# Patient Record
Sex: FEMALE | Race: WHITE | NOT HISPANIC OR LATINO | Employment: UNEMPLOYED | URBAN - METROPOLITAN AREA
[De-identification: names, ages, dates, MRNs, and addresses within clinical notes are randomized per-mention and may not be internally consistent; named-entity substitution may affect disease eponyms.]

---

## 2020-01-29 ENCOUNTER — TRANSCRIBE ORDERS (OUTPATIENT)
Dept: ADMINISTRATIVE | Facility: HOSPITAL | Age: 35
End: 2020-01-29

## 2020-01-29 DIAGNOSIS — R20.2 PARESTHESIA: Primary | ICD-10-CM

## 2020-02-26 ENCOUNTER — APPOINTMENT (OUTPATIENT)
Dept: INFUSION THERAPY | Facility: HOSPITAL | Age: 35
End: 2020-02-26

## 2020-03-12 ENCOUNTER — HOSPITAL ENCOUNTER (OUTPATIENT)
Dept: INFUSION THERAPY | Facility: HOSPITAL | Age: 35
Discharge: HOME | End: 2020-03-12
Admitting: PSYCHIATRY & NEUROLOGY

## 2020-03-12 DIAGNOSIS — R20.2 PARESTHESIA: ICD-10-CM

## 2020-03-12 PROCEDURE — 95886 MUSC TEST DONE W/N TEST COMP: CPT

## 2020-03-12 PROCEDURE — 95911 NRV CNDJ TEST 9-10 STUDIES: CPT | Performed by: PSYCHIATRY & NEUROLOGY

## 2020-03-12 PROCEDURE — 95911 NRV CNDJ TEST 9-10 STUDIES: CPT

## 2020-03-12 PROCEDURE — 95886 MUSC TEST DONE W/N TEST COMP: CPT | Performed by: PSYCHIATRY & NEUROLOGY

## 2020-03-12 NOTE — PROCEDURES
EMG and Nerve Conduction Studies    I.      Instrument used: Neuromax 1002  II.     Please see data sheets for tabular summary of NCS and details on methods, temperatures and lab standards.   III.    EMG muscles tested for upper extremity studies include the deltoid, biceps, triceps, pronator teres, extensor digitorum communis, first dorsal interosseous and abductor pollicis brevis.    IV.   EMG muscles tested for lower extremity studies include the vastus lateralis, tibialis anterior, peroneus longus, medial gastrocnemius and extensor digitorum brevis.    V.    Additional muscles tested as needed.  Paraspinal muscles tested as needed.   VI.   Please see data sheets for tabular summary of EMG findings.   VII. The complete report includes the data sheets.      Indication: Neck and low back pain with bilateral sciatica  History: 34-year-old white female with neck and low back pain with bilateral sciatica.  She also describes waking from sleep with her right hand numb and tingly taking a few minutes for it to improve.  She has numbness and tingling radiating down the legs additionally.      Ht: Not reported  Wt: Not reported  HbA1C: No results found for: HGBA1C  TSH: No results found for: TSH    Technical summary:  Nerve conduction studies were obtained in the right arm and right leg.  Skin temperatures on the right palm were at least 34 °C.  Temperature is at the right ankle was around 31 °C.  Temperature correction was not needed since the distal latencies were normal.  Needle examination was obtained on selected muscles of all 4 extremities.    Results:  1.  Normal right median antidromic sensory study.  2.  Normal right median orthodromic palmar sensory latency at 2.2 ms with normal amplitude.  3.  Normal right ulnar sensory study.  4.  Normal right median motor study.  5.  Normal right ulnar motor study.  6.  Normal right sural sensory study.  7.  Normal right superficial peroneal sensory study.  8.  Normal right  peroneal motor study.  9.  Normal right tibial motor study.  10.  Needle examination was obtained on selected muscles in all 4 extremities showing normal insertional activities throughout.  There were normal motor units and recruitment patterns throughout.    Impression:  Normal study.  No evidence of a right median neuropathy at the wrist or a more diffuse peripheral neuropathy.  No evidence of a cervical or lumbosacral radiculopathy on either side by this study.  Study results were discussed with the patient.    Wenceslao Dhillon M.D.              Dictated utilizing Dragon dictation.

## 2021-06-30 ENCOUNTER — OFFICE (AMBULATORY)
Dept: URBAN - METROPOLITAN AREA CLINIC 64 | Facility: CLINIC | Age: 36
End: 2021-06-30
Payer: COMMERCIAL

## 2021-06-30 VITALS
HEIGHT: 60 IN | DIASTOLIC BLOOD PRESSURE: 75 MMHG | WEIGHT: 189 LBS | HEART RATE: 94 BPM | SYSTOLIC BLOOD PRESSURE: 118 MMHG

## 2021-06-30 DIAGNOSIS — K92.0 HEMATEMESIS: ICD-10-CM

## 2021-06-30 DIAGNOSIS — R10.13 EPIGASTRIC PAIN: ICD-10-CM

## 2021-06-30 DIAGNOSIS — R10.11 RIGHT UPPER QUADRANT PAIN: ICD-10-CM

## 2021-06-30 DIAGNOSIS — K76.0 FATTY (CHANGE OF) LIVER, NOT ELSEWHERE CLASSIFIED: ICD-10-CM

## 2021-06-30 DIAGNOSIS — K62.5 HEMORRHAGE OF ANUS AND RECTUM: ICD-10-CM

## 2021-06-30 DIAGNOSIS — K59.00 CONSTIPATION, UNSPECIFIED: ICD-10-CM

## 2021-06-30 DIAGNOSIS — R11.2 NAUSEA WITH VOMITING, UNSPECIFIED: ICD-10-CM

## 2021-06-30 PROCEDURE — 99204 OFFICE O/P NEW MOD 45 MIN: CPT | Performed by: INTERNAL MEDICINE

## 2021-07-09 PROBLEM — R10.13 EPIGASTRIC PAIN: Status: ACTIVE | Noted: 2021-07-09

## 2021-07-20 PROCEDURE — 99283 EMERGENCY DEPT VISIT LOW MDM: CPT

## 2021-07-21 ENCOUNTER — APPOINTMENT (OUTPATIENT)
Dept: CT IMAGING | Facility: HOSPITAL | Age: 36
End: 2021-07-21

## 2021-07-21 ENCOUNTER — HOSPITAL ENCOUNTER (EMERGENCY)
Facility: HOSPITAL | Age: 36
Discharge: HOME | End: 2021-07-21
Attending: EMERGENCY MEDICINE | Admitting: EMERGENCY MEDICINE

## 2021-07-21 ENCOUNTER — APPOINTMENT (OUTPATIENT)
Dept: GENERAL RADIOLOGY | Facility: HOSPITAL | Age: 36
End: 2021-07-21

## 2021-07-21 VITALS
TEMPERATURE: 97.9 F | OXYGEN SATURATION: 98 % | HEART RATE: 72 BPM | BODY MASS INDEX: 35.93 KG/M2 | HEIGHT: 60 IN | RESPIRATION RATE: 18 BRPM | SYSTOLIC BLOOD PRESSURE: 112 MMHG | DIASTOLIC BLOOD PRESSURE: 76 MMHG | WEIGHT: 183 LBS

## 2021-07-21 DIAGNOSIS — S33.5XXA LUMBAR SPRAIN, INITIAL ENCOUNTER: ICD-10-CM

## 2021-07-21 DIAGNOSIS — S13.9XXA NECK SPRAIN, INITIAL ENCOUNTER: ICD-10-CM

## 2021-07-21 DIAGNOSIS — S83.91XA SPRAIN OF RIGHT KNEE, UNSPECIFIED LIGAMENT, INITIAL ENCOUNTER: ICD-10-CM

## 2021-07-21 DIAGNOSIS — S09.90XA INJURY OF HEAD, INITIAL ENCOUNTER: Primary | ICD-10-CM

## 2021-07-21 PROCEDURE — 72131 CT LUMBAR SPINE W/O DYE: CPT

## 2021-07-21 PROCEDURE — 73564 X-RAY EXAM KNEE 4 OR MORE: CPT

## 2021-07-21 PROCEDURE — 70450 CT HEAD/BRAIN W/O DYE: CPT

## 2021-07-21 PROCEDURE — 72125 CT NECK SPINE W/O DYE: CPT

## 2021-07-21 RX ORDER — CYCLOBENZAPRINE HCL 10 MG
10 TABLET ORAL 3 TIMES DAILY PRN
Qty: 15 TABLET | Refills: 0 | Status: SHIPPED | OUTPATIENT
Start: 2021-07-21 | End: 2022-01-25

## 2021-07-21 RX ORDER — IBUPROFEN 400 MG/1
800 TABLET ORAL ONCE
Status: COMPLETED | OUTPATIENT
Start: 2021-07-21 | End: 2021-07-21

## 2021-07-21 RX ADMIN — IBUPROFEN 800 MG: 400 TABLET, FILM COATED ORAL at 03:54
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
## 2021-07-23 ENCOUNTER — OFFICE (AMBULATORY)
Dept: URBAN - METROPOLITAN AREA PATHOLOGY 4 | Facility: PATHOLOGY | Age: 36
End: 2021-07-23
Payer: COMMERCIAL

## 2021-07-23 ENCOUNTER — ON CAMPUS - OUTPATIENT (AMBULATORY)
Dept: URBAN - METROPOLITAN AREA HOSPITAL 2 | Facility: HOSPITAL | Age: 36
End: 2021-07-23
Payer: COMMERCIAL

## 2021-07-23 VITALS
SYSTOLIC BLOOD PRESSURE: 132 MMHG | OXYGEN SATURATION: 94 % | WEIGHT: 183 LBS | DIASTOLIC BLOOD PRESSURE: 79 MMHG | OXYGEN SATURATION: 99 % | SYSTOLIC BLOOD PRESSURE: 100 MMHG | HEIGHT: 60 IN | DIASTOLIC BLOOD PRESSURE: 84 MMHG | SYSTOLIC BLOOD PRESSURE: 97 MMHG | DIASTOLIC BLOOD PRESSURE: 92 MMHG | HEART RATE: 83 BPM | RESPIRATION RATE: 19 BRPM | DIASTOLIC BLOOD PRESSURE: 60 MMHG | RESPIRATION RATE: 17 BRPM | SYSTOLIC BLOOD PRESSURE: 116 MMHG | RESPIRATION RATE: 18 BRPM | DIASTOLIC BLOOD PRESSURE: 96 MMHG | DIASTOLIC BLOOD PRESSURE: 71 MMHG | RESPIRATION RATE: 16 BRPM | SYSTOLIC BLOOD PRESSURE: 127 MMHG | HEART RATE: 74 BPM | HEART RATE: 70 BPM | SYSTOLIC BLOOD PRESSURE: 128 MMHG | HEART RATE: 77 BPM | HEART RATE: 67 BPM | TEMPERATURE: 96.9 F | HEART RATE: 86 BPM | OXYGEN SATURATION: 98 %

## 2021-07-23 DIAGNOSIS — K29.80 DUODENITIS WITHOUT BLEEDING: ICD-10-CM

## 2021-07-23 DIAGNOSIS — K25.3 ACUTE GASTRIC ULCER WITHOUT HEMORRHAGE OR PERFORATION: ICD-10-CM

## 2021-07-23 DIAGNOSIS — R10.11 RIGHT UPPER QUADRANT PAIN: ICD-10-CM

## 2021-07-23 DIAGNOSIS — K31.89 OTHER DISEASES OF STOMACH AND DUODENUM: ICD-10-CM

## 2021-07-23 DIAGNOSIS — R10.13 EPIGASTRIC PAIN: ICD-10-CM

## 2021-07-23 DIAGNOSIS — K92.0 HEMATEMESIS: ICD-10-CM

## 2021-07-23 DIAGNOSIS — K21.00 GASTRO-ESOPHAGEAL REFLUX DISEASE WITH ESOPHAGITIS, WITHOUT B: ICD-10-CM

## 2021-07-23 PROBLEM — K20.80 OTHER ESOPHAGITIS WITHOUT BLEEDING: Status: ACTIVE | Noted: 2021-07-23

## 2021-07-23 LAB
GI HISTOLOGY: A. UNSPECIFIED: (no result)
GI HISTOLOGY: B. SELECT: (no result)
GI HISTOLOGY: PDF REPORT: (no result)

## 2021-07-23 PROCEDURE — 88305 TISSUE EXAM BY PATHOLOGIST: CPT | Performed by: INTERNAL MEDICINE

## 2021-07-23 PROCEDURE — 43239 EGD BIOPSY SINGLE/MULTIPLE: CPT | Performed by: INTERNAL MEDICINE

## 2021-07-23 RX ORDER — PANTOPRAZOLE 40 MG/1
40 TABLET, DELAYED RELEASE ORAL
Qty: 90 | Refills: 3 | Status: ACTIVE
Start: 2021-07-23

## 2021-07-23 RX ORDER — FAMOTIDINE 40 MG/1
40 TABLET, FILM COATED ORAL
Qty: 90 | Refills: 3 | Status: ACTIVE
Start: 2021-07-23

## 2021-07-27 ENCOUNTER — OFFICE VISIT (OUTPATIENT)
Dept: ORTHOPEDIC SURGERY | Facility: CLINIC | Age: 36
End: 2021-07-27

## 2021-07-27 VITALS — WEIGHT: 183 LBS | BODY MASS INDEX: 35.93 KG/M2 | HEIGHT: 60 IN

## 2021-07-27 DIAGNOSIS — M25.562 LEFT KNEE PAIN, UNSPECIFIED CHRONICITY: Primary | ICD-10-CM

## 2021-07-27 DIAGNOSIS — S89.91XA INJURY OF RIGHT KNEE, INITIAL ENCOUNTER: ICD-10-CM

## 2021-07-27 PROBLEM — A49.9 ESBL (EXTENDED SPECTRUM BETA-LACTAMASE) PRODUCING BACTERIA INFECTION: Status: ACTIVE | Noted: 2019-09-16

## 2021-07-27 PROBLEM — Z16.12 ESBL (EXTENDED SPECTRUM BETA-LACTAMASE) PRODUCING BACTERIA INFECTION: Status: ACTIVE | Noted: 2019-09-16

## 2021-07-27 PROCEDURE — 99203 OFFICE O/P NEW LOW 30 MIN: CPT | Performed by: ORTHOPAEDIC SURGERY

## 2021-07-27 PROCEDURE — 99406 BEHAV CHNG SMOKING 3-10 MIN: CPT | Performed by: ORTHOPAEDIC SURGERY
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
## 2021-08-16 ENCOUNTER — APPOINTMENT (OUTPATIENT)
Dept: MRI IMAGING | Facility: HOSPITAL | Age: 36
End: 2021-08-16

## 2021-08-25 ENCOUNTER — OFFICE VISIT (OUTPATIENT)
Dept: FAMILY MEDICINE CLINIC | Facility: CLINIC | Age: 36
End: 2021-08-25

## 2021-08-25 VITALS
HEART RATE: 102 BPM | DIASTOLIC BLOOD PRESSURE: 82 MMHG | BODY MASS INDEX: 34.73 KG/M2 | SYSTOLIC BLOOD PRESSURE: 134 MMHG | WEIGHT: 177.85 LBS | TEMPERATURE: 97.1 F | OXYGEN SATURATION: 99 %

## 2021-08-25 DIAGNOSIS — K59.09 CHRONIC CONSTIPATION: ICD-10-CM

## 2021-08-25 DIAGNOSIS — R32 URINARY INCONTINENCE, UNSPECIFIED TYPE: ICD-10-CM

## 2021-08-25 DIAGNOSIS — D71 CHRONIC GRANULOMATOUS DISEASE (HCC): Primary | ICD-10-CM

## 2021-08-25 DIAGNOSIS — R06.09 DYSPNEA ON EXERTION: ICD-10-CM

## 2021-08-25 PROCEDURE — 99202 OFFICE O/P NEW SF 15 MIN: CPT | Performed by: FAMILY MEDICINE

## 2021-08-25 RX ORDER — MULTIPLE VITAMINS W/ MINERALS TAB 9MG-400MCG
1 TAB ORAL DAILY
COMMUNITY
End: 2023-03-02

## 2021-08-25 RX ORDER — PANTOPRAZOLE SODIUM 40 MG/1
40 TABLET, DELAYED RELEASE ORAL DAILY
COMMUNITY

## 2021-08-25 RX ORDER — DESVENLAFAXINE 100 MG/1
100 TABLET, EXTENDED RELEASE ORAL DAILY
COMMUNITY
Start: 2021-05-13 | End: 2022-01-25

## 2021-08-25 RX ORDER — FAMOTIDINE 40 MG/1
1 TABLET, FILM COATED ORAL
COMMUNITY
Start: 2021-07-23

## 2021-08-25 RX ORDER — ZINC 25 MG
1 TABLET ORAL DAILY
COMMUNITY

## 2021-08-25 RX ORDER — SPIRONOLACTONE 50 MG/1
100 TABLET, FILM COATED ORAL 2 TIMES DAILY
COMMUNITY
Start: 2021-05-13

## 2021-08-25 RX ORDER — SORBITOL SOLUTION 70 %
SOLUTION, ORAL MISCELLANEOUS
Qty: 60 ML | Refills: 0 | Status: SHIPPED | OUTPATIENT
Start: 2021-08-25 | End: 2022-01-25

## 2021-08-25 RX ORDER — METHENAMINE, SODIUM PHOSPHATE, MONOBASIC, MONOHYDRATE, PHENYL SALICYLATE, METHYLENE BLUE, AND HYOSCYAMINE SULFATE 120; 40.8; 36; 10; .12 MG/1; MG/1; MG/1; MG/1; MG/1
1 CAPSULE ORAL 4 TIMES DAILY
COMMUNITY
Start: 2021-06-29 | End: 2022-01-25

## 2021-08-25 RX ORDER — MULTIVIT-MIN/IRON/FOLIC ACID/K 18-600-40
1 CAPSULE ORAL DAILY
COMMUNITY
End: 2023-03-02

## 2021-08-25 RX ORDER — VALACYCLOVIR HYDROCHLORIDE 500 MG/1
500 TABLET, FILM COATED ORAL DAILY
COMMUNITY
Start: 2021-05-13

## 2021-08-25 RX ORDER — ERGOCALCIFEROL 1.25 MG/1
50000 CAPSULE ORAL WEEKLY
COMMUNITY
End: 2023-03-02

## 2021-08-25 RX ORDER — PHENTERMINE HYDROCHLORIDE 37.5 MG/1
37.5 TABLET ORAL
COMMUNITY
Start: 2021-06-29 | End: 2021-11-08 | Stop reason: SDUPTHER

## 2021-08-25 RX ORDER — BUSPIRONE HYDROCHLORIDE 15 MG/1
10 TABLET ORAL 2 TIMES DAILY
COMMUNITY
Start: 2021-08-05 | End: 2022-06-21

## 2021-08-25 NOTE — PROGRESS NOTES
Subjective   Rand Schilling is a 36 y.o. female.     Rand Schilling is to establish as a new patient.  She has some issues with chronic constipation that she is concerned about. She has been diagnosed with chronic granulomatous disease by her GI, though I do not have all of the notes yet.  She also has used phentermine in the past for weight loss from another provider and is interested in getting a prescription for that as well. There is no history of chest pain or dyspnea. There is no history of issue with bladder dysfunction. There is no history of dizziness or lightheadedness. There is no history of issue with sleep or mood. There is no history of issue with present medication.            /82 (BP Location: Left arm, Patient Position: Sitting, Cuff Size: Large Adult)   Pulse 102   Temp 97.1 °F (36.2 °C) (Temporal)   Wt 80.7 kg (177 lb 13.6 oz)   SpO2 99%   BMI 34.73 kg/m²       Chief Complaint   Patient presents with   • GI Problem     New Pat Est - nothing is helping constapated    • Weight Loss     ob-gyn in [facility] prescribes it but wants to see if you can           Current Outpatient Medications:   •  Ascorbic Acid (Vitamin C) 500 MG capsule, Take 1 capsule by mouth Daily., Disp: , Rfl:   •  busPIRone (BUSPAR) 15 MG tablet, Take 7.5 mg by mouth 2 (Two) Times a Day., Disp: , Rfl:   •  cyanocobalamin ([employer] Vitamin B-12) 2000 MCG tablet, Take 2,000 mcg by mouth Daily., Disp: , Rfl:   •  desvenlafaxine (PRISTIQ) 100 MG 24 hr tablet, Take 100 mg by mouth Daily., Disp: , Rfl:   •  famotidine (PEPCID) 40 MG tablet, Take 1 tablet by mouth every night at bedtime., Disp: , Rfl:   •  multivitamin with minerals (One-A-Day Womens) tablet tablet, Take 1 tablet by mouth Daily., Disp: , Rfl:   •  pantoprazole (PROTONIX) 40 MG EC tablet, Take 40 mg by mouth Daily., Disp: , Rfl:   •  phentermine (ADIPEX-P) 37.5 MG tablet, Take 37.5 mg by mouth Every Morning Before Breakfast., Disp: , Rfl:   •  spironolactone (ALDACTONE) 50  MG tablet, Take 50 mg by mouth 2 (two) times a day., Disp: , Rfl:   •  uribel (URO-MP) 118 MG capsule capsule, Take 1 capsule by mouth 4 (Four) Times a Day., Disp: , Rfl:   •  valACYclovir (VALTREX) 500 MG tablet, Take 500 mg by mouth Daily., Disp: , Rfl:   •  vitamin D (ERGOCALCIFEROL) 1.25 MG (18273 UT) capsule capsule, Take 50,000 Units by mouth 1 (One) Time Per Week., Disp: , Rfl:   •  Zinc 25 MG tablet, Take 1 tablet by mouth Daily., Disp: , Rfl:   •  cyclobenzaprine (FLEXERIL) 10 MG tablet, Take 1 tablet by mouth 3 (Three) Times a Day As Needed for Muscle Spasms for up to 15 doses., Disp: 15 tablet, Rfl: 0  •  sorbitol 70 % solution, Drink the contents one time, mix the sorbitol with unsweetened tea, Disp: 60 mL, Rfl: 0        The following portions of the patient's history were reviewed and updated as appropriate: allergies, current medications, past family history, past medical history, past social history, past surgical history, and problem list.    Review of Systems   Constitutional: Positive for fatigue. Negative for activity change and fever.   HENT: Negative for congestion, sinus pressure, sinus pain, sore throat and trouble swallowing.    Eyes: Negative for visual disturbance.   Respiratory: Negative for chest tightness, shortness of breath and wheezing.    Cardiovascular: Negative for chest pain.   Gastrointestinal: Positive for constipation. Negative for abdominal distention, abdominal pain, diarrhea, nausea and vomiting.   Genitourinary: Positive for frequency and urgency. Negative for difficulty urinating and dysuria.   Musculoskeletal: Negative for back pain and neck pain.   Skin: Negative for rash.   Psychiatric/Behavioral: Negative for agitation, decreased concentration, hallucinations and suicidal ideas.       Objective   Physical Exam  Vitals and nursing note reviewed.   Cardiovascular:      Rate and Rhythm: Normal rate and regular rhythm.      Heart sounds: Normal heart sounds. No murmur  heard.     Pulmonary:      Effort: Pulmonary effort is normal.      Breath sounds: No wheezing or rales.   Abdominal:      General: Bowel sounds are normal.      Palpations: Abdomen is soft.      Tenderness: There is no abdominal tenderness. There is no guarding.   Musculoskeletal:      Cervical back: Neck supple.      Right lower leg: No edema.      Left lower leg: No edema.   Lymphadenopathy:      Cervical: No cervical adenopathy.   Skin:     Findings: No rash.   Neurological:      General: No focal deficit present.      Mental Status: She is alert and oriented to person, place, and time.   Psychiatric:         Mood and Affect: Mood normal.           Assessment/Plan   Problems Addressed this Visit     None      Visit Diagnoses     Chronic granulomatous disease (CMS/HCC)    -  Primary    Relevant Medications    cyanocobalamin ([employer] Vitamin B-12) 2000 MCG tablet    Other Relevant Orders    Ambulatory Referral to Pulmonology (Completed)    Chronic constipation        Urinary incontinence, unspecified type        Relevant Orders    Ambulatory Referral to Urology (Completed)    Dyspnea on exertion        Relevant Orders    Ambulatory Referral to Pulmonology (Completed)      Diagnoses       Codes Comments    Chronic granulomatous disease (CMS/HCC)    -  Primary ICD-10-CM: D71  ICD-9-CM: 288.1     Chronic constipation     ICD-10-CM: K59.09  ICD-9-CM: 564.00     Urinary incontinence, unspecified type     ICD-10-CM: R32  ICD-9-CM: 788.30     Dyspnea on exertion     ICD-10-CM: R06.00  ICD-9-CM: 786.09         I will have her try some sorbitol for the constipation, as she has struggled with everything else, including mag citrate  I will refer to urology and pulmonary at her request for new follow up of chronic issues; she just needs providers closer to home now that she has moved to the area  I will set her up for follow up as indicated by response to treatment

## 2021-10-26 ENCOUNTER — OFFICE (AMBULATORY)
Dept: URBAN - METROPOLITAN AREA CLINIC 64 | Facility: CLINIC | Age: 36
End: 2021-10-26
Payer: COMMERCIAL

## 2021-10-26 VITALS
SYSTOLIC BLOOD PRESSURE: 123 MMHG | HEART RATE: 62 BPM | HEIGHT: 60 IN | WEIGHT: 173 LBS | DIASTOLIC BLOOD PRESSURE: 84 MMHG

## 2021-10-26 DIAGNOSIS — K92.0 HEMATEMESIS: ICD-10-CM

## 2021-10-26 DIAGNOSIS — K62.5 HEMORRHAGE OF ANUS AND RECTUM: ICD-10-CM

## 2021-10-26 PROCEDURE — 99214 OFFICE O/P EST MOD 30 MIN: CPT | Performed by: INTERNAL MEDICINE

## 2021-10-26 RX ORDER — PANTOPRAZOLE 40 MG/1
40 TABLET, DELAYED RELEASE ORAL
Qty: 90 | Refills: 3 | Status: ACTIVE
Start: 2021-07-23

## 2021-11-08 ENCOUNTER — OFFICE VISIT (OUTPATIENT)
Dept: FAMILY MEDICINE CLINIC | Facility: CLINIC | Age: 36
End: 2021-11-08

## 2021-11-08 VITALS
OXYGEN SATURATION: 100 % | DIASTOLIC BLOOD PRESSURE: 86 MMHG | BODY MASS INDEX: 34.18 KG/M2 | WEIGHT: 175 LBS | TEMPERATURE: 97.3 F | SYSTOLIC BLOOD PRESSURE: 121 MMHG | HEART RATE: 81 BPM

## 2021-11-08 DIAGNOSIS — M54.42 CHRONIC MIDLINE LOW BACK PAIN WITH LEFT-SIDED SCIATICA: Primary | ICD-10-CM

## 2021-11-08 DIAGNOSIS — D71 CHRONIC GRANULOMATOUS DISEASE (HCC): ICD-10-CM

## 2021-11-08 DIAGNOSIS — G89.29 CHRONIC NECK PAIN: ICD-10-CM

## 2021-11-08 DIAGNOSIS — M54.2 CHRONIC NECK PAIN: ICD-10-CM

## 2021-11-08 DIAGNOSIS — G89.29 CHRONIC MIDLINE LOW BACK PAIN WITH LEFT-SIDED SCIATICA: Primary | ICD-10-CM

## 2021-11-08 PROCEDURE — 99213 OFFICE O/P EST LOW 20 MIN: CPT | Performed by: FAMILY MEDICINE

## 2021-11-08 RX ORDER — FLUCONAZOLE 200 MG/1
200 TABLET ORAL DAILY
Qty: 5 TABLET | Refills: 0 | Status: SHIPPED | OUTPATIENT
Start: 2021-11-08 | End: 2022-01-25

## 2021-11-08 RX ORDER — DULOXETIN HYDROCHLORIDE 60 MG/1
60 CAPSULE, DELAYED RELEASE ORAL DAILY
COMMUNITY
Start: 2021-10-15

## 2021-11-08 RX ORDER — PHENTERMINE HYDROCHLORIDE 37.5 MG/1
37.5 TABLET ORAL
Qty: 30 TABLET | Refills: 0 | Status: SHIPPED | OUTPATIENT
Start: 2021-11-08 | End: 2021-12-30 | Stop reason: SDUPTHER

## 2021-11-08 NOTE — PROGRESS NOTES
Subjective   Rand Schilling is a 36 y.o. female.     Rand Schilling is in for follow up on her last visit. She has seen urology and they are working on issues. She has seen GI and liver evaluation and colonoscopy are planned. She is due to see pulmonary soon as well. There is concern that she has chronic granulomatous disease but needs further testing to know. She is having some low back pain with some left leg numbness of late, having done some vigorous work around the house recently.  She has also noticed some numbness and tingling in her hands particularly the fingers of late with some occasional neck pain.  There is no history of chest pain or dyspnea. There is no history of issue with bowel dysfunction. There is no history of dizziness or lightheadedness. There is no history of issue with sleep or mood. There is no history of issue with present medication.            /86 (BP Location: Left arm, Patient Position: Sitting, Cuff Size: Large Adult)   Pulse 81   Temp 97.3 °F (36.3 °C) (Temporal)   Wt 79.4 kg (175 lb)   SpO2 100%   BMI 34.18 kg/m²       Chief Complaint   Patient presents with   • Follow-up     talk about Urology    • Pain     she wants to get the pain under control and the cold is not helping            Current Outpatient Medications:   •  Ascorbic Acid (Vitamin C) 500 MG capsule, Take 1 capsule by mouth Daily., Disp: , Rfl:   •  busPIRone (BUSPAR) 15 MG tablet, Take 7.5 mg by mouth 2 (Two) Times a Day., Disp: , Rfl:   •  cyanocobalamin ([employer] Vitamin B-12) 2000 MCG tablet, Take 2,000 mcg by mouth Daily., Disp: , Rfl:   •  DULoxetine (CYMBALTA) 30 MG capsule, Take 30 mg by mouth Daily., Disp: , Rfl:   •  famotidine (PEPCID) 40 MG tablet, Take 1 tablet by mouth every night at bedtime., Disp: , Rfl:   •  multivitamin with minerals (One-A-Day Womens) tablet tablet, Take 1 tablet by mouth Daily., Disp: , Rfl:   •  pantoprazole (PROTONIX) 40 MG EC tablet, Take 40 mg by mouth Daily., Disp: , Rfl:    •  spironolactone (ALDACTONE) 50 MG tablet, Take 50 mg by mouth 2 (two) times a day., Disp: , Rfl:   •  valACYclovir (VALTREX) 500 MG tablet, Take 500 mg by mouth Daily., Disp: , Rfl:   •  vitamin D (ERGOCALCIFEROL) 1.25 MG (94165 UT) capsule capsule, Take 50,000 Units by mouth 1 (One) Time Per Week., Disp: , Rfl:   •  Zinc 25 MG tablet, Take 1 tablet by mouth Daily., Disp: , Rfl:   •  cyclobenzaprine (FLEXERIL) 10 MG tablet, Take 1 tablet by mouth 3 (Three) Times a Day As Needed for Muscle Spasms for up to 15 doses., Disp: 15 tablet, Rfl: 0  •  desvenlafaxine (PRISTIQ) 100 MG 24 hr tablet, Take 100 mg by mouth Daily., Disp: , Rfl:   •  fluconazole (DIFLUCAN) 200 MG tablet, Take 1 tablet by mouth Daily., Disp: 5 tablet, Rfl: 0  •  phentermine (ADIPEX-P) 37.5 MG tablet, Take 1 tablet by mouth Every Morning Before Breakfast., Disp: 30 tablet, Rfl: 0  •  sorbitol 70 % solution, Drink the contents one time, mix the sorbitol with unsweetened tea, Disp: 60 mL, Rfl: 0  •  uribel (URO-MP) 118 MG capsule capsule, Take 1 capsule by mouth 4 (Four) Times a Day., Disp: , Rfl:         The following portions of the patient's history were reviewed and updated as appropriate: allergies, current medications, past family history, past medical history, past social history, past surgical history, and problem list.    Review of Systems   Constitutional: Positive for fatigue. Negative for activity change and fever.   HENT: Negative for congestion, sinus pressure, sinus pain, sore throat and trouble swallowing.    Eyes: Negative for visual disturbance.   Respiratory: Negative for chest tightness, shortness of breath and wheezing.    Cardiovascular: Negative for chest pain.   Gastrointestinal: Positive for constipation. Negative for abdominal distention, abdominal pain, diarrhea, nausea and vomiting.   Genitourinary: Positive for frequency and urgency. Negative for difficulty urinating and dysuria.   Musculoskeletal: Positive for back  pain. Negative for neck pain.   Skin: Negative for rash.   Neurological: Positive for numbness. Negative for dizziness and weakness.   Psychiatric/Behavioral: Negative for agitation, decreased concentration, hallucinations and suicidal ideas.       Objective   Physical Exam  Vitals and nursing note reviewed.   Cardiovascular:      Rate and Rhythm: Normal rate and regular rhythm.      Heart sounds: Normal heart sounds. No murmur heard.      Pulmonary:      Effort: Pulmonary effort is normal.      Breath sounds: No wheezing or rales.   Abdominal:      General: Bowel sounds are normal.      Palpations: Abdomen is soft.      Tenderness: There is no abdominal tenderness. There is no guarding.   Musculoskeletal:      Cervical back: Neck supple.      Right lower leg: No edema.      Left lower leg: No edema.   Lymphadenopathy:      Cervical: No cervical adenopathy.   Neurological:      General: No focal deficit present.      Mental Status: She is alert and oriented to person, place, and time.   Psychiatric:         Mood and Affect: Mood normal.           Assessment/Plan   Problems Addressed this Visit     None      Visit Diagnoses     Chronic midline low back pain with left-sided sciatica    -  Primary    Relevant Orders    MRI Lumbar Spine Without Contrast    Chronic neck pain        Relevant Orders    XR Spine Cervical Complete 4 or 5 View    Chronic granulomatous disease (HCC)        Relevant Medications    phentermine (ADIPEX-P) 37.5 MG tablet      Diagnoses       Codes Comments    Chronic midline low back pain with left-sided sciatica    -  Primary ICD-10-CM: M54.42, G89.29  ICD-9-CM: 724.2, 724.3, 338.29     Chronic neck pain     ICD-10-CM: M54.2, G89.29  ICD-9-CM: 723.1, 338.29     Chronic granulomatous disease (HCC)     ICD-10-CM: D71  ICD-9-CM: 288.1         I will follow up on upcoming testing and see if the CGD diagnosis is verified  I will attempt to get MRI of the lower spine given her complaints today and  chronic history of lower back issues  I will get some neck x-rays as well given some numbness and tingling in the hands that she is experiencing  I will see her back in 2 months, sooner if needed  I did ask her to try to stay as active as she can tolerate and continue to work on the weight issue as she has been  I did encourage her to begin her Covid vaccine series as soon as possible

## 2021-11-12 ENCOUNTER — PATIENT MESSAGE (OUTPATIENT)
Dept: FAMILY MEDICINE CLINIC | Facility: CLINIC | Age: 36
End: 2021-11-12

## 2021-11-12 DIAGNOSIS — D71 CHRONIC GRANULOMATOUS DISEASE (HCC): Primary | ICD-10-CM

## 2021-11-16 ENCOUNTER — PATIENT MESSAGE (OUTPATIENT)
Dept: FAMILY MEDICINE CLINIC | Facility: CLINIC | Age: 36
End: 2021-11-16

## 2021-12-02 ENCOUNTER — HOSPITAL ENCOUNTER (OUTPATIENT)
Dept: MRI IMAGING | Facility: HOSPITAL | Age: 36
Discharge: HOME | End: 2021-12-02

## 2021-12-02 DIAGNOSIS — S89.91XA INJURY OF RIGHT KNEE, INITIAL ENCOUNTER: ICD-10-CM

## 2021-12-02 DIAGNOSIS — M54.42 CHRONIC MIDLINE LOW BACK PAIN WITH LEFT-SIDED SCIATICA: ICD-10-CM

## 2021-12-02 DIAGNOSIS — G89.29 CHRONIC MIDLINE LOW BACK PAIN WITH LEFT-SIDED SCIATICA: ICD-10-CM

## 2021-12-02 PROCEDURE — 72148 MRI LUMBAR SPINE W/O DYE: CPT

## 2021-12-02 PROCEDURE — 73721 MRI JNT OF LWR EXTRE W/O DYE: CPT

## 2021-12-04 ENCOUNTER — PATIENT MESSAGE (OUTPATIENT)
Dept: FAMILY MEDICINE CLINIC | Facility: CLINIC | Age: 36
End: 2021-12-04

## 2021-12-30 ENCOUNTER — OFFICE VISIT (OUTPATIENT)
Dept: FAMILY MEDICINE CLINIC | Facility: CLINIC | Age: 36
End: 2021-12-30

## 2021-12-30 VITALS
HEART RATE: 82 BPM | SYSTOLIC BLOOD PRESSURE: 120 MMHG | BODY MASS INDEX: 33.01 KG/M2 | DIASTOLIC BLOOD PRESSURE: 81 MMHG | TEMPERATURE: 98.7 F | WEIGHT: 169 LBS | OXYGEN SATURATION: 100 %

## 2021-12-30 DIAGNOSIS — D71 CHRONIC GRANULOMATOUS DISEASE (HCC): ICD-10-CM

## 2021-12-30 DIAGNOSIS — M54.42 CHRONIC MIDLINE LOW BACK PAIN WITH LEFT-SIDED SCIATICA: ICD-10-CM

## 2021-12-30 DIAGNOSIS — R20.0 BILATERAL HAND NUMBNESS: Primary | ICD-10-CM

## 2021-12-30 DIAGNOSIS — G89.29 CHRONIC MIDLINE LOW BACK PAIN WITH LEFT-SIDED SCIATICA: ICD-10-CM

## 2021-12-30 PROCEDURE — 99214 OFFICE O/P EST MOD 30 MIN: CPT | Performed by: FAMILY MEDICINE

## 2021-12-30 RX ORDER — VIBEGRON 75 MG/1
TABLET, FILM COATED ORAL
COMMUNITY
End: 2023-03-02

## 2021-12-30 RX ORDER — DOCUSATE SODIUM 100 MG/1
100 CAPSULE, LIQUID FILLED ORAL 2 TIMES DAILY
COMMUNITY
End: 2022-01-25

## 2021-12-30 RX ORDER — PHENTERMINE HYDROCHLORIDE 37.5 MG/1
37.5 TABLET ORAL
Qty: 30 TABLET | Refills: 0 | Status: SHIPPED | OUTPATIENT
Start: 2021-12-30 | End: 2022-01-31

## 2021-12-30 RX ORDER — ALBUTEROL SULFATE 90 UG/1
2 AEROSOL, METERED RESPIRATORY (INHALATION) EVERY 6 HOURS PRN
COMMUNITY
Start: 2021-11-09 | End: 2023-03-02 | Stop reason: SDUPTHER

## 2021-12-30 RX ORDER — POLYETHYLENE GLYCOL 3350 17 G/17G
17 POWDER, FOR SOLUTION ORAL DAILY
COMMUNITY
End: 2023-03-02

## 2021-12-30 NOTE — PROGRESS NOTES
Subjective   Rand Schilling is a 36 y.o. female.     Rand Schilling is in for follow up on her chronic issues.  She has upcoming visits with GI and pulmonary as there is concern about chronic granulomatous disease with her.  She has an enlarged liver that has yet to be properly explained.  She just does not feel well.  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  She has had some abnormal neck x-rays in the past there is no history of chest pain or dyspnea. There is no history of issue with bowel or bladder dysfunction. There is no history of dizziness or lightheadedness. There is no history of issue with sleep or mood. There is no history of issue with present medication.            /81 (BP Location: Left arm, Patient Position: Sitting, Cuff Size: Large Adult)   Pulse 82   Temp 98.7 °F (37.1 °C) (Temporal)   Wt 76.7 kg (169 lb)   SpO2 100%   BMI 33.01 kg/m²       Chief Complaint   Patient presents with   • Numbness     2 month f/u            Current Outpatient Medications:   •  albuterol sulfate  (90 Base) MCG/ACT inhaler, 2 puffs Every 6 (Six) Hours As Needed., Disp: , Rfl:   •  Ascorbic Acid (Vitamin C) 500 MG capsule, Take 1 capsule by mouth Daily., Disp: , Rfl:   •  busPIRone (BUSPAR) 15 MG tablet, Take 7.5 mg by mouth 2 (Two) Times a Day., Disp: , Rfl:   •  cyanocobalamin ([employer] Vitamin B-12) 2000 MCG tablet, Take 2,000 mcg by mouth Daily., Disp: , Rfl:   •  docusate sodium (COLACE) 100 MG capsule, Take 100 mg by mouth 2 (Two) Times a Day., Disp: , Rfl:   •  DULoxetine (CYMBALTA) 60 MG capsule, Take 60 mg by mouth Daily., Disp: , Rfl:   •  famotidine (PEPCID) 40 MG tablet, Take 1 tablet by mouth every night at bedtime., Disp: , Rfl:   •  multivitamin with minerals (One-A-Day Womens) tablet tablet, Take 1 tablet by mouth Daily., Disp: , Rfl:   •  pantoprazole (PROTONIX) 40 MG EC tablet, Take 40  mg by mouth Daily., Disp: , Rfl:   •  phentermine (ADIPEX-P) 37.5 MG tablet, Take 1 tablet by mouth Every Morning Before Breakfast., Disp: 30 tablet, Rfl: 0  •  polyethylene glycol (MiraLax) 17 g packet, Take 17 g by mouth Daily. AM, Disp: , Rfl:   •  spironolactone (ALDACTONE) 50 MG tablet, Take 50 mg by mouth 2 (two) times a day., Disp: , Rfl:   •  valACYclovir (VALTREX) 500 MG tablet, Take 500 mg by mouth Daily., Disp: , Rfl:   •  Vibegron (Gemtesa) 75 MG tablet, Take  by mouth., Disp: , Rfl:   •  vitamin D (ERGOCALCIFEROL) 1.25 MG (12078 UT) capsule capsule, Take 50,000 Units by mouth 1 (One) Time Per Week., Disp: , Rfl:   •  Zinc 25 MG tablet, Take 1 tablet by mouth Daily., Disp: , Rfl:   •  cyclobenzaprine (FLEXERIL) 10 MG tablet, Take 1 tablet by mouth 3 (Three) Times a Day As Needed for Muscle Spasms for up to 15 doses., Disp: 15 tablet, Rfl: 0  •  desvenlafaxine (PRISTIQ) 100 MG 24 hr tablet, Take 100 mg by mouth Daily., Disp: , Rfl:   •  fluconazole (DIFLUCAN) 200 MG tablet, Take 1 tablet by mouth Daily., Disp: 5 tablet, Rfl: 0  •  sorbitol 70 % solution, Drink the contents one time, mix the sorbitol with unsweetened tea, Disp: 60 mL, Rfl: 0  •  uribel (URO-MP) 118 MG capsule capsule, Take 1 capsule by mouth 4 (Four) Times a Day., Disp: , Rfl:         The following portions of the patient's history were reviewed and updated as appropriate: allergies, current medications, past family history, past medical history, past social history, past surgical history, and problem list.    Review of Systems   Constitutional: Positive for fatigue. Negative for activity change and fever.   HENT: Negative for congestion, sinus pressure, sinus pain, sore throat and trouble swallowing.    Eyes: Negative for visual disturbance.   Respiratory: Negative for chest tightness, shortness of breath and wheezing.    Cardiovascular: Negative for chest pain.   Gastrointestinal: Positive for constipation. Negative for abdominal  distention, abdominal pain, diarrhea, nausea and vomiting.   Genitourinary: Positive for frequency and urgency. Negative for difficulty urinating and dysuria.   Musculoskeletal: Positive for back pain. Negative for neck pain.   Skin: Negative for rash.   Neurological: Positive for numbness. Negative for dizziness and weakness.   Psychiatric/Behavioral: Negative for agitation, decreased concentration, hallucinations and suicidal ideas.       Objective   Physical Exam  Vitals and nursing note reviewed.   Constitutional:       Appearance: Normal appearance.   Cardiovascular:      Rate and Rhythm: Normal rate and regular rhythm.      Heart sounds: Normal heart sounds. No murmur heard.      Pulmonary:      Effort: Pulmonary effort is normal.      Breath sounds: No wheezing or rales.   Abdominal:      General: Bowel sounds are normal.      Palpations: Abdomen is soft.      Tenderness: There is no abdominal tenderness. There is no guarding.   Musculoskeletal:      Cervical back: Neck supple.      Right lower leg: No edema.      Left lower leg: No edema.   Lymphadenopathy:      Cervical: No cervical adenopathy.   Skin:     Findings: No bruising or rash.   Neurological:      General: No focal deficit present.      Mental Status: She is alert and oriented to person, place, and time.   Psychiatric:         Mood and Affect: Mood normal.           Assessment/Plan   Problems Addressed this Visit     None      Visit Diagnoses     Bilateral hand numbness    -  Primary    Relevant Orders    MRI Cervical Spine Without Contrast    Chronic granulomatous disease (HCC)        Relevant Medications    phentermine (ADIPEX-P) 37.5 MG tablet    Chronic midline low back pain with left-sided sciatica        Relevant Orders    Ambulatory Referral to Pain Management      Diagnoses       Codes Comments    Bilateral hand numbness    -  Primary ICD-10-CM: R20.0  ICD-9-CM: 782.0     Chronic granulomatous disease (HCC)     ICD-10-CM: D71  ICD-9-CM:  288.1     Chronic midline low back pain with left-sided sciatica     ICD-10-CM: M54.42, G89.29  ICD-9-CM: 724.2, 724.3, 338.29         I will follow up on her upcoming visits with specialists  I will attempt to get a CGD testing kit that she found online  I will likely see her back in a few months  She is to call with new concerns  I am referring to pain management for treatment of the foraminal stenosis and will attempt to get a cervical MRI to see if she has foraminal stenosis there as well  If she does not she will need a referral to hand to see if this is carpal tunnel  I did advise Covid vaccination but she is leery right now until she has a more firm diagnosis

## 2022-01-12 ENCOUNTER — OFFICE (AMBULATORY)
Dept: URBAN - METROPOLITAN AREA CLINIC 64 | Facility: CLINIC | Age: 37
End: 2022-01-12
Payer: COMMERCIAL

## 2022-01-12 VITALS
HEART RATE: 91 BPM | DIASTOLIC BLOOD PRESSURE: 83 MMHG | HEIGHT: 60 IN | WEIGHT: 173 LBS | SYSTOLIC BLOOD PRESSURE: 112 MMHG

## 2022-01-12 DIAGNOSIS — K92.0 HEMATEMESIS: ICD-10-CM

## 2022-01-12 DIAGNOSIS — K62.5 HEMORRHAGE OF ANUS AND RECTUM: ICD-10-CM

## 2022-01-12 PROCEDURE — 99214 OFFICE O/P EST MOD 30 MIN: CPT | Performed by: INTERNAL MEDICINE

## 2022-01-19 ENCOUNTER — ANESTHESIA EVENT (OUTPATIENT)
Dept: GASTROENTEROLOGY | Facility: HOSPITAL | Age: 37
End: 2022-01-19

## 2022-01-21 ENCOUNTER — ON CAMPUS - OUTPATIENT (AMBULATORY)
Dept: URBAN - METROPOLITAN AREA HOSPITAL 85 | Facility: HOSPITAL | Age: 37
End: 2022-01-21
Payer: COMMERCIAL

## 2022-01-21 ENCOUNTER — HOSPITAL ENCOUNTER (OUTPATIENT)
Facility: HOSPITAL | Age: 37
Setting detail: HOSPITAL OUTPATIENT SURGERY
Discharge: HOME | End: 2022-01-21
Attending: INTERNAL MEDICINE | Admitting: INTERNAL MEDICINE

## 2022-01-21 ENCOUNTER — ANESTHESIA (OUTPATIENT)
Dept: GASTROENTEROLOGY | Facility: HOSPITAL | Age: 37
End: 2022-01-21

## 2022-01-21 VITALS
OXYGEN SATURATION: 99 % | TEMPERATURE: 99 F | SYSTOLIC BLOOD PRESSURE: 109 MMHG | HEART RATE: 74 BPM | RESPIRATION RATE: 12 BRPM | DIASTOLIC BLOOD PRESSURE: 57 MMHG | WEIGHT: 171.74 LBS | HEIGHT: 58 IN | BODY MASS INDEX: 36.05 KG/M2

## 2022-01-21 DIAGNOSIS — D12.8 BENIGN NEOPLASM OF RECTUM: ICD-10-CM

## 2022-01-21 DIAGNOSIS — K92.0 HEMATEMESIS: ICD-10-CM

## 2022-01-21 DIAGNOSIS — K64.0 FIRST DEGREE HEMORRHOIDS: ICD-10-CM

## 2022-01-21 DIAGNOSIS — K62.5 RECTAL BLEEDING: ICD-10-CM

## 2022-01-21 DIAGNOSIS — K62.5 HEMORRHAGE OF ANUS AND RECTUM: ICD-10-CM

## 2022-01-21 PROCEDURE — 25010000002 PROPOFOL 10 MG/ML EMULSION: Performed by: ANESTHESIOLOGIST ASSISTANT

## 2022-01-21 PROCEDURE — 88305 TISSUE EXAM BY PATHOLOGIST: CPT | Performed by: INTERNAL MEDICINE

## 2022-01-21 PROCEDURE — 45385 COLONOSCOPY W/LESION REMOVAL: CPT | Performed by: INTERNAL MEDICINE

## 2022-01-21 PROCEDURE — 45380 COLONOSCOPY AND BIOPSY: CPT | Mod: 59 | Performed by: INTERNAL MEDICINE

## 2022-01-21 RX ORDER — GLYCOPYRROLATE 0.2 MG/ML
INJECTION INTRAMUSCULAR; INTRAVENOUS AS NEEDED
Status: DISCONTINUED | OUTPATIENT
Start: 2022-01-21 | End: 2022-01-21 | Stop reason: SURG

## 2022-01-21 RX ORDER — SODIUM CHLORIDE 9 MG/ML
9 INJECTION, SOLUTION INTRAVENOUS CONTINUOUS PRN
Status: DISCONTINUED | OUTPATIENT
Start: 2022-01-21 | End: 2022-01-21 | Stop reason: HOSPADM

## 2022-01-21 RX ORDER — PROPOFOL 10 MG/ML
VIAL (ML) INTRAVENOUS AS NEEDED
Status: DISCONTINUED | OUTPATIENT
Start: 2022-01-21 | End: 2022-01-21 | Stop reason: SURG

## 2022-01-21 RX ORDER — LIDOCAINE HYDROCHLORIDE 10 MG/ML
0.5 INJECTION, SOLUTION EPIDURAL; INFILTRATION; INTRACAUDAL; PERINEURAL ONCE AS NEEDED
Status: DISCONTINUED | OUTPATIENT
Start: 2022-01-21 | End: 2022-01-21 | Stop reason: HOSPADM

## 2022-01-21 RX ORDER — SODIUM CHLORIDE 0.9 % (FLUSH) 0.9 %
10 SYRINGE (ML) INJECTION AS NEEDED
Status: DISCONTINUED | OUTPATIENT
Start: 2022-01-21 | End: 2022-01-21 | Stop reason: HOSPADM

## 2022-01-21 RX ORDER — SODIUM CHLORIDE 0.9 % (FLUSH) 0.9 %
10 SYRINGE (ML) INJECTION EVERY 12 HOURS SCHEDULED
Status: DISCONTINUED | OUTPATIENT
Start: 2022-01-21 | End: 2022-01-21 | Stop reason: HOSPADM

## 2022-01-21 RX ORDER — LIDOCAINE HYDROCHLORIDE 20 MG/ML
INJECTION, SOLUTION EPIDURAL; INFILTRATION; INTRACAUDAL; PERINEURAL AS NEEDED
Status: DISCONTINUED | OUTPATIENT
Start: 2022-01-21 | End: 2022-01-21 | Stop reason: SURG

## 2022-01-21 RX ORDER — ONDANSETRON 2 MG/ML
4 INJECTION INTRAMUSCULAR; INTRAVENOUS ONCE AS NEEDED
Status: DISCONTINUED | OUTPATIENT
Start: 2022-01-21 | End: 2022-01-21 | Stop reason: HOSPADM

## 2022-01-21 RX ORDER — SODIUM CHLORIDE 0.9 % (FLUSH) 0.9 %
3 SYRINGE (ML) INJECTION EVERY 12 HOURS SCHEDULED
Status: DISCONTINUED | OUTPATIENT
Start: 2022-01-21 | End: 2022-01-21 | Stop reason: HOSPADM

## 2022-01-21 RX ADMIN — LIDOCAINE HYDROCHLORIDE 100 MG: 20 INJECTION, SOLUTION EPIDURAL; INFILTRATION; INTRACAUDAL; PERINEURAL at 08:24

## 2022-01-21 RX ADMIN — PROPOFOL 50 MG: 10 INJECTION, EMULSION INTRAVENOUS at 08:27

## 2022-01-21 RX ADMIN — PROPOFOL 100 MG: 10 INJECTION, EMULSION INTRAVENOUS at 08:25

## 2022-01-21 RX ADMIN — GLYCOPYRROLATE 0.2 MG: 0.2 INJECTION INTRAMUSCULAR; INTRAVENOUS at 08:21

## 2022-01-21 RX ADMIN — PROPOFOL 50 MG: 10 INJECTION, EMULSION INTRAVENOUS at 08:36

## 2022-01-21 RX ADMIN — PROPOFOL 50 MG: 10 INJECTION, EMULSION INTRAVENOUS at 08:26

## 2022-01-21 RX ADMIN — SODIUM CHLORIDE 9 ML/HR: 9 INJECTION, SOLUTION INTRAVENOUS at 07:25

## 2022-01-21 RX ADMIN — PROPOFOL 20 MG: 10 INJECTION, EMULSION INTRAVENOUS at 08:39

## 2022-01-21 RX ADMIN — PROPOFOL 50 MG: 10 INJECTION, EMULSION INTRAVENOUS at 08:32

## 2022-01-21 NOTE — ANESTHESIA POSTPROCEDURE EVALUATION
Patient: Rand Schilling    Procedure Summary     Date: 01/21/22 Room / Location: [facility] ENDOSCOPY 4 / [facility] ENDOSCOPY    Anesthesia Start: 0819 Anesthesia Stop: 0848    Procedure: COLONOSCOPY with large intestine tissue biopsy, rectal polyps, internal hemorrhoids (N/A ) Diagnosis:       Rectal bleeding      Hematemesis      (Rectal bleeding [K62.5])      (Hematemesis [K92.0])    Surgeons: Alberto Pearson MD Provider: Gold Thrasher MD    Anesthesia Type: MAC ASA Status: 2          Anesthesia Type: MAC    Vitals  Vitals Value Taken Time   BP 98/75 01/21/22 0850   Temp     Pulse 81 01/21/22 0850   Resp 13 01/21/22 0850   SpO2 98 % 01/21/22 0850           Post Anesthesia Care and Evaluation    Patient location during evaluation: PACU  Patient participation: complete - patient participated  Level of consciousness: awake  Pain scale: See nurse's notes for pain score.  Pain management: adequate  Airway patency: patent  Anesthetic complications: No anesthetic complications  PONV Status: none  Cardiovascular status: acceptable  Respiratory status: acceptable  Hydration status: acceptable    Comments: Patient seen and examined postoperatively; vital signs stable; SpO2 greater than or equal to 90%; cardiopulmonary status stable; nausea/vomiting adequately controlled; pain adequately controlled; no apparent anesthesia complications; patient discharged from anesthesia care when discharge criteria were met

## 2022-01-21 NOTE — DISCHARGE INSTRUCTIONS
A responsible adult should stay with you and you should rest quietly for the rest of the day.    Do not drink alcohol, drive, operate any heavy machinery or power tools or make any legal/important decisions for the next 24 hours.     Progress your diet as tolerated.  If you begin to experience severe pain, increased shortness of breath, racing heartbeat or a fever above 101 F, seek immediate medical attention.     Follow up with MD as instructed. Call office for results in 3 to 5 days if needed.    Follow-up biopsy results  Repeat colonoscopy in 3 years if polyps are adenomatous and 10 years of polyps are hyperplastic  Consider hemorrhoid banding if bleeding persists

## 2022-01-21 NOTE — H&P
GI CONSULT  NOTE:    Referring Provider:    Edwar Correa MD  [unfilled]    Chief complaint: <principal problem not specified>    Subjective .       Pre op diagnosis  Rectal bleeding [K62.5]  Hematemesis [K92.0]      History of present illness:      Rand Schilling is a 36 y.o. female who presents today for Procedure(s):  COLONOSCOPY for the indications listed below.     The updated Patient Profile was reviewed prior to the procedure, in conjunction with the Physical Exam, including medical conditions, surgical procedures, medications, allergies, family history and social history.     Pre-operatively, I reviewed the indication(s) for the procedure, the risks of the procedure [including but not limited to: unexpected bleeding possibly requiring hospitalization and/or unplanned repeat procedures, perforation possibly requiring surgical treatment, missed lesions and complications of sedation/MAC (also explained by anesthesia staff)].     I have evaluated the patient for risks associated with the planned anesthesia and the procedure to be performed and find the patient an acceptable candidate for IV sedation.    Multiple opportunities were provided for any questions or concerns, and all questions were answered satisfactorily before any anesthesia was administered. We will proceed with the planned procedure.    Past Medical History:  Past Medical History:   Diagnosis Date   • Anxiety    • Depression    • Genital herpes    • Granulomatosis        Past Surgical History:  Past Surgical History:   Procedure Laterality Date   • BREAST SURGERY     • ENDOSCOPY     • HYSTERECTOMY         Social History:  Social History     Tobacco Use   • Smoking status: Current Every Day Smoker     Types: Cigarettes   • Smokeless tobacco: Never Used   Vaping Use   • Vaping Use: Never used   Substance Use Topics   • Alcohol use: Defer   • Drug use: Defer       Family History:  Family History   Family history unknown: Yes        Medications:  Medications Prior to Admission   Medication Sig Dispense Refill Last Dose   • Ascorbic Acid (Vitamin C) 500 MG capsule Take 1 capsule by mouth Daily.   Past Week at Unknown time   • busPIRone (BUSPAR) 15 MG tablet Take 7.5 mg by mouth 2 (Two) Times a Day.   Past Week at Unknown time   • cyanocobalamin ([employer] Vitamin B-12) 2000 MCG tablet Take 2,000 mcg by mouth Daily.   Past Week at Unknown time   • DULoxetine (CYMBALTA) 60 MG capsule Take 60 mg by mouth Daily.   Past Week at Unknown time   • famotidine (PEPCID) 40 MG tablet Take 1 tablet by mouth every night at bedtime.   Past Week at Unknown time   • multivitamin with minerals (One-A-Day Womens) tablet tablet Take 1 tablet by mouth Daily.   Past Week at Unknown time   • pantoprazole (PROTONIX) 40 MG EC tablet Take 40 mg by mouth Daily.   Past Week at Unknown time   • phentermine (ADIPEX-P) 37.5 MG tablet Take 1 tablet by mouth Every Morning Before Breakfast. 30 tablet 0 1/6/2022   • polyethylene glycol (MiraLax) 17 g packet Take 17 g by mouth Daily. AM   Past Week at Unknown time   • sorbitol 70 % solution Drink the contents one time, mix the sorbitol with unsweetened tea 60 mL 0 1/21/2022 at Unknown time   • spironolactone (ALDACTONE) 50 MG tablet Take 50 mg by mouth 2 (two) times a day.   Past Week at Unknown time   • valACYclovir (VALTREX) 500 MG tablet Take 500 mg by mouth Daily.   Past Week at Unknown time   • Vibegron (Gemtesa) 75 MG tablet Take  by mouth.   Past Week at Unknown time   • vitamin D (ERGOCALCIFEROL) 1.25 MG (99395 UT) capsule capsule Take 50,000 Units by mouth 1 (One) Time Per Week.   Past Week at Unknown time   • Zinc 25 MG tablet Take 1 tablet by mouth Daily.   Past Week at Unknown time   • albuterol sulfate  (90 Base) MCG/ACT inhaler 2 puffs Every 6 (Six) Hours As Needed.   More than a month at Unknown time   • cyclobenzaprine (FLEXERIL) 10 MG tablet Take 1 tablet by mouth 3 (Three) Times a Day As Needed for Muscle  "Spasms for up to 15 doses. 15 tablet 0    • desvenlafaxine (PRISTIQ) 100 MG 24 hr tablet Take 100 mg by mouth Daily.      • docusate sodium (COLACE) 100 MG capsule Take 100 mg by mouth 2 (Two) Times a Day.      • fluconazole (DIFLUCAN) 200 MG tablet Take 1 tablet by mouth Daily. 5 tablet 0    • uribel (URO-MP) 118 MG capsule capsule Take 1 capsule by mouth 4 (Four) Times a Day.          Scheduled Meds:sodium chloride, 10 mL, Intravenous, Q12H      Continuous Infusions:sodium chloride, 9 mL/hr, Last Rate: 9 mL/hr (01/21/22 0725)      PRN Meds:.•  lidocaine PF 1%  •  sodium chloride  •  sodium chloride    ALLERGIES:  Penicillins    ROS:  The following systems were reviewed and negative;  Constitution:  No fevers, chills, no unintentional weight loss  Skin: no rash, no jaundice  Eyes:  No blurry vision, no eye pain  HENT:  No change in hearing or smell  Resp:  No dyspnea or cough  CV:  No chest pain or palpitations  :  No dysuria, hematuria  Musculoskeletal:  No leg cramps or arthralgias  Neuro:  No tremor, no numbness  Psych:  No depression or confsusion    Objective     Vital Signs:   Vitals:    01/14/22 1616 01/21/22 0716   BP:  124/79   BP Location:  Left arm   Patient Position:  Lying   Pulse:  76   Resp:  17   Temp:  99 °F (37.2 °C)   TempSrc:  Oral   SpO2:  97%   Weight: 74.8 kg (165 lb) 77.9 kg (171 lb 11.8 oz)   Height: 147.3 cm (58\") 147.3 cm (58\")       Physical Exam:       General Appearance:    Awake and alert, in no acute distress   Head:    Normocephalic, without obvious abnormality, atraumatic   Throat:   No oral lesions, no thrush, oral mucosa moist   Lungs:     respirations regular, even and unlabored   Skin:   No rash, no jaundice       Results Review:  Lab Results (last 24 hours)     ** No results found for the last 24 hours. **          Imaging Results (Last 24 Hours)     ** No results found for the last 24 hours. **           I reviewed the patient's labs and imaging.    ASSESSMENT AND " PLAN:      Active Problems:    * No active hospital problems. *       Procedure(s):  COLONOSCOPY      I discussed the patient's findings and my recommendations with the patient.    Alberto Pearson MD  01/21/22  08:19 EST

## 2022-01-21 NOTE — ANESTHESIA PREPROCEDURE EVALUATION
Anesthesia Evaluation     Patient summary reviewed and Nursing notes reviewed   NPO Solid Status: > 8 hours  NPO Liquid Status: > 8 hours           Airway   Mallampati: II  TM distance: >3 FB  Neck ROM: full  No difficulty expected  Dental - normal exam     Pulmonary - normal exam     ROS comment: Chronic granulomatous disease.   Cardiovascular - normal exam        Neuro/Psych  (+) psychiatric history Depression and Anxiety,     GI/Hepatic/Renal/Endo    (+) morbid obesity, GI bleeding lower ,     Musculoskeletal     Abdominal  - normal exam    Bowel sounds: normal.   Substance History      OB/GYN          Other                      Anesthesia Plan    ASA 2     MAC     intravenous induction     Anesthetic plan, all risks, benefits, and alternatives have been provided, discussed and informed consent has been obtained with: patient.    Plan discussed with CRNA and CAA.        CODE STATUS:

## 2022-01-21 NOTE — OP NOTE
COLONOSCOPY Procedure Report    Patient Name:  Rand Schilling  YOB: 1985    Date of Surgery:  1/21/2022     Pre-Op Diagnosis:  Rectal bleeding [K62.5]  Hematemesis [K92.0]       Postop diagnosis:  1.  Internal hemorrhoids  2.  Colon polyps    Procedure/CPT® Codes:      Procedure(s):  COLONOSCOPY with large intestine tissue biopsy, rectal polyps, internal hemorrhoids    Staff:  Surgeon(s):  Alberto Pearson MD      Anesthesia: Monitored Anesthesia Care    Description of Procedure:  A description of the procedure as well as risks, benefits and alternative methods were explained to the patient who voiced understanding and signed the corresponding consent form. A physical exam was performed and vital signs were monitored throughout the procedure.    A rectal exam was performed which was normal. An Olympus colonoscope was placed into the rectum and proceeded under direct visualization through the colon until the cecum and appendiceal orifice were identified. Careful visualization occurred upon slow withdraw of the scope. The scope was then retroflexed and the distal rectum was visualized. The quality of the prep was good. The procedure was not difficult and there were no immediate complications.  There was no blood loss.    Impression:  1.  Five polyps that were 3 to 4 mm and sessile in the rectum removed via cold snare and sent for histopathology  2.  Otherwise normal colonic mucosa entire colon, cold forcep biopsies were taken randomly for histopathology and microscopic colitis.  3.  Normal terminal ileum mucosa  4.  Grade 1 large internal hemorrhoids    Recommendations:  Follow-up biopsy results  Repeat colonoscopy in 3 years if polyps are adenomatous and 10 years of polyps are hyperplastic  Consider hemorrhoid banding if bleeding persists      Alberto Pearson MD     Date: 1/21/2022    Time: 08:45 EST

## 2022-01-23 ENCOUNTER — PATIENT MESSAGE (OUTPATIENT)
Dept: FAMILY MEDICINE CLINIC | Facility: CLINIC | Age: 37
End: 2022-01-23

## 2022-01-24 DIAGNOSIS — R20.0 BILATERAL HAND NUMBNESS: Primary | ICD-10-CM

## 2022-01-24 LAB
LAB AP CASE REPORT: NORMAL
PATH REPORT.FINAL DX SPEC: NORMAL
PATH REPORT.GROSS SPEC: NORMAL

## 2022-01-25 ENCOUNTER — APPOINTMENT (OUTPATIENT)
Dept: OTHER | Facility: HOSPITAL | Age: 37
End: 2022-01-25

## 2022-01-25 ENCOUNTER — OFFICE VISIT (OUTPATIENT)
Dept: PULMONOLOGY | Facility: HOSPITAL | Age: 37
End: 2022-01-25

## 2022-01-25 ENCOUNTER — OFFICE VISIT (OUTPATIENT)
Dept: PAIN MEDICINE | Facility: CLINIC | Age: 37
End: 2022-01-25

## 2022-01-25 VITALS
HEART RATE: 85 BPM | HEIGHT: 58 IN | BODY MASS INDEX: 34.63 KG/M2 | RESPIRATION RATE: 16 BRPM | WEIGHT: 165 LBS | DIASTOLIC BLOOD PRESSURE: 76 MMHG | OXYGEN SATURATION: 96 % | SYSTOLIC BLOOD PRESSURE: 129 MMHG

## 2022-01-25 VITALS
BODY MASS INDEX: 34.63 KG/M2 | WEIGHT: 165 LBS | RESPIRATION RATE: 10 BRPM | SYSTOLIC BLOOD PRESSURE: 104 MMHG | HEIGHT: 58 IN | DIASTOLIC BLOOD PRESSURE: 72 MMHG | HEART RATE: 82 BPM | OXYGEN SATURATION: 98 %

## 2022-01-25 DIAGNOSIS — G47.33 OSA (OBSTRUCTIVE SLEEP APNEA): ICD-10-CM

## 2022-01-25 DIAGNOSIS — F17.200 SMOKING: ICD-10-CM

## 2022-01-25 DIAGNOSIS — M54.12 CERVICAL RADICULITIS: ICD-10-CM

## 2022-01-25 DIAGNOSIS — J44.9 CHRONIC OBSTRUCTIVE PULMONARY DISEASE, UNSPECIFIED COPD TYPE: ICD-10-CM

## 2022-01-25 DIAGNOSIS — M47.812 CERVICAL SPONDYLOSIS WITHOUT MYELOPATHY: ICD-10-CM

## 2022-01-25 DIAGNOSIS — Z09 FOLLOW UP: ICD-10-CM

## 2022-01-25 DIAGNOSIS — G89.4 CHRONIC PAIN SYNDROME: Primary | ICD-10-CM

## 2022-01-25 DIAGNOSIS — R91.1 INCIDENTAL LUNG NODULE, > 3MM AND < 8MM: ICD-10-CM

## 2022-01-25 DIAGNOSIS — M54.16 LUMBAR RADICULITIS: ICD-10-CM

## 2022-01-25 DIAGNOSIS — M47.817 LUMBOSACRAL SPONDYLOSIS WITHOUT MYELOPATHY: ICD-10-CM

## 2022-01-25 DIAGNOSIS — M79.18 MYOFASCIAL PAIN SYNDROME: ICD-10-CM

## 2022-01-25 DIAGNOSIS — R06.02 SHORTNESS OF BREATH: Primary | ICD-10-CM

## 2022-01-25 PROBLEM — M19.90 ARTHRITIS: Status: ACTIVE | Noted: 2021-10-15

## 2022-01-25 PROBLEM — D71 GRANULOMATOUS DISEASE: Status: ACTIVE | Noted: 2021-10-15

## 2022-01-25 PROCEDURE — G0463 HOSPITAL OUTPT CLINIC VISIT: HCPCS

## 2022-01-25 PROCEDURE — 99204 OFFICE O/P NEW MOD 45 MIN: CPT | Performed by: ANESTHESIOLOGY

## 2022-01-25 RX ORDER — SENNA PLUS 8.6 MG/1
1 TABLET ORAL 2 TIMES DAILY
COMMUNITY

## 2022-01-25 RX ORDER — BUDESONIDE AND FORMOTEROL FUMARATE DIHYDRATE 80; 4.5 UG/1; UG/1
2 AEROSOL RESPIRATORY (INHALATION) 2 TIMES DAILY
Qty: 1 EACH | Refills: 5 | Status: SHIPPED | OUTPATIENT
Start: 2022-01-25 | End: 2023-03-02 | Stop reason: SDUPTHER
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
## 2022-01-25 NOTE — PROGRESS NOTES
Subjective    CC neck, upper and lower back and multiple joint pain  Rand Schilling is a 36 y.o. female with history of chronic granulomatous disease, chronic pain/polyarthralgia, chronic neck and back pain here for initial evaluation.  Referred by PCP..   Complains of continued and worsening chronic back pain radiating to bilateral hips and left lower extremity with burning tingling numbness in the leg and sharp pain worse with activity however constant.  Denies saddle anesthesia bladder bowel continence.  Chronic neck pain radiating to bilateral shoulders and upper thoracic paraspinal areas.  Denies upper extremity radicular pain.  States she had dealt with pain since she was 16 years old uncertain about her chronic pain symptoms thinking it is likely related to chronic granulomatous disease, she is filing for disability for these reasons.  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  Pain impairing her daily activity and sleep.  Have tried home exercise program, physical therapy in the past but cannot participate due to pain.    C-spine MRI : Mild C4 C6 spondylosis. No herniated disc canal or neuroforaminal stenosis.   C-spine x-ray  early degenerative changes C4-C5 and C5-C6.  L-spine MRI  mild multilevel degenerative changes of lumbar spine no canal stenosis. Mild bilateral foraminal narrowing at L4-L5, mild left foraminal narrowing L3-L4 and L5-S1.  Right knee MRI  mild patellar chondromalacia otherwise no evidence of internal derangement or significant degenerative change.    Pain Assessment   Location of Pain: All over  Description of Pain: Dull/Aching, Throbbing, Stabbing  Previous Pain Rating :7  Current Pain Ratin  Aggravating Factors: Activity  Alleviating Factors: Rest, Medication    PEG Assessment   What number best describes your pain on average in the past week?7  What number best describes how, during the past week, pain has interfered with your enjoyment of life?6  What  "number best describes how, during the past week, pain has interfered with your general activity?  10    The following portions of the patient's history were reviewed and updated as appropriate: allergies, current medications, past family history, past medical history, past social history, past surgical history and problem list.     has a past medical history of Anxiety, Arthritis, Cancer (HCC), Depression, Enlarged liver, Genital herpes, GERD (gastroesophageal reflux disease), Granulomatosis, High cholesterol, Joint pain, Knee pain, Leg pain, Low back pain, Lung nodules, MRSA infection, Neck pain, and Numbness and tingling of both feet.   has a past surgical history that includes Hysterectomy; Breast surgery; Esophagogastroduodenoscopy; Colonoscopy (N/A, 1/21/2022); Other surgical history; Finger fracture surgery; and Tubal ligation.  Family history is unknown by patient.  Social History     Tobacco Use   • Smoking status: Current Every Day Smoker     Packs/day: 1.00     Types: Cigarettes   • Smokeless tobacco: Never Used   Substance Use Topics   • Alcohol use: Not Currently       Review of Systems   Musculoskeletal: Positive for arthralgias, back pain, myalgias and neck pain.   All other systems reviewed and are negative.      Objective   Physical Exam  Vitals reviewed.   Constitutional:       General: She is not in acute distress.     Appearance: She is obese.   Pulmonary:      Effort: Pulmonary effort is normal.   Musculoskeletal:      Cervical back: Tenderness present. Decreased range of motion.      Lumbar back: Tenderness present. Decreased range of motion. Positive left straight leg raise test.      Comments: Lumbar loading positive, pain on extension of low back past 5 degrees.  TTP on the lumbar facets noted.         /76   Pulse 85   Resp 16   Ht 147.3 cm (58\")   Wt 74.8 kg (165 lb)   SpO2 96%   BMI 34.49 kg/m²     PHQ 9 on chart  Opioid risk tool low risk    Assessment/Plan   Diagnoses and all " orders for this visit:    1. Chronic pain syndrome (Primary)    2. Lumbosacral spondylosis without myelopathy    3. Lumbar radiculitis  -     Epidural Block    4. Cervical spondylosis without myelopathy    5. Cervical radiculitis    6. Myofascial pain syndrome      Summary  Rand Schilling is a 36 y.o. female with history of chronic granulomatous disease, chronic pain/polyarthralgia, chronic neck and back pain here for initial evaluation.  Referred by PCP.  Chronic pain from lumbar and cervical DDD spondylosis, with radicular pain.  Chronic polyarthralgia generalized myofascial pain.    States she had dealt with pain since she was 16 years old uncertain about her chronic pain symptoms thinking it is likely related to chronic granulomatous disease, she is filing for disability for these reasons.  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  Pain impairing her daily activity and sleep.  Have tried home exercise program, physical therapy in the past but cannot participate due to pain.    C-spine MRI with mild generative changes spondylosis at C4-C5 and C5-C6.  L-spine MRI with mild degenerative changes no significant canal narrowing but foraminal narrowing mild to moderate at L4-S1 worse on the left.    States she is currently unable to participate in physical therapy due to pain  We will schedule for LESI (left parasagittal).  Risks and benefits discussed.  We will plan on cervical KLAUS after that.    RTC for procedure

## 2022-01-31 ENCOUNTER — PATIENT MESSAGE (OUTPATIENT)
Dept: FAMILY MEDICINE CLINIC | Facility: CLINIC | Age: 37
End: 2022-01-31

## 2022-01-31 DIAGNOSIS — D71 CHRONIC GRANULOMATOUS DISEASE: ICD-10-CM

## 2022-01-31 RX ORDER — PHENTERMINE HYDROCHLORIDE 37.5 MG/1
TABLET ORAL
Qty: 30 TABLET | Refills: 0 | Status: SHIPPED | OUTPATIENT
Start: 2022-01-31 | End: 2022-03-15 | Stop reason: SDUPTHER

## 2022-02-01 ENCOUNTER — HOSPITAL ENCOUNTER (OUTPATIENT)
Dept: GENERAL RADIOLOGY | Facility: HOSPITAL | Age: 37
Discharge: HOME | End: 2022-02-01
Admitting: FAMILY MEDICINE

## 2022-02-01 ENCOUNTER — PATIENT MESSAGE (OUTPATIENT)
Dept: FAMILY MEDICINE CLINIC | Facility: CLINIC | Age: 37
End: 2022-02-01

## 2022-02-01 DIAGNOSIS — R20.0 BILATERAL HAND NUMBNESS: ICD-10-CM

## 2022-02-01 PROCEDURE — 73120 X-RAY EXAM OF HAND: CPT

## 2022-02-01 NOTE — TELEPHONE ENCOUNTER
Called Dr Seipel office got [facility] for March 8 at 7:45am and called pt to let know

## 2022-02-02 ENCOUNTER — PATIENT MESSAGE (OUTPATIENT)
Dept: FAMILY MEDICINE CLINIC | Facility: CLINIC | Age: 37
End: 2022-02-02

## 2022-02-02 DIAGNOSIS — R20.0 BILATERAL HAND NUMBNESS: Primary | ICD-10-CM

## 2022-02-04 ENCOUNTER — HOSPITAL ENCOUNTER (EMERGENCY)
Facility: HOSPITAL | Age: 37
Discharge: LEFT BEFORE DISCHARGE | End: 2022-02-04

## 2022-02-04 PROCEDURE — 99211 OFF/OP EST MAY X REQ PHY/QHP: CPT

## 2022-02-05 ENCOUNTER — APPOINTMENT (OUTPATIENT)
Dept: GENERAL RADIOLOGY | Facility: HOSPITAL | Age: 37
End: 2022-02-05

## 2022-02-05 ENCOUNTER — HOSPITAL ENCOUNTER (EMERGENCY)
Facility: HOSPITAL | Age: 37
Discharge: HOME | End: 2022-02-06
Attending: EMERGENCY MEDICINE | Admitting: EMERGENCY MEDICINE

## 2022-02-05 DIAGNOSIS — U07.1 COVID: Primary | ICD-10-CM

## 2022-02-05 LAB
BASOPHILS # BLD AUTO: 0.1 10*3/MM3 (ref 0–0.2)
BASOPHILS NFR BLD AUTO: 1.1 % (ref 0–1.5)
BILIRUB UR QL STRIP: NEGATIVE
CLARITY UR: CLEAR
COLOR UR: YELLOW
DEPRECATED RDW RBC AUTO: 46.4 FL (ref 37–54)
EOSINOPHIL # BLD AUTO: 0.1 10*3/MM3 (ref 0–0.4)
EOSINOPHIL NFR BLD AUTO: 1.6 % (ref 0.3–6.2)
ERYTHROCYTE [DISTWIDTH] IN BLOOD BY AUTOMATED COUNT: 13.3 % (ref 12.3–15.4)
GLUCOSE UR STRIP-MCNC: NEGATIVE MG/DL
HCT VFR BLD AUTO: 43.2 % (ref 34–46.6)
HGB BLD-MCNC: 14.5 G/DL (ref 12–15.9)
HGB UR QL STRIP.AUTO: NEGATIVE
KETONES UR QL STRIP: ABNORMAL
LEUKOCYTE ESTERASE UR QL STRIP.AUTO: NEGATIVE
LYMPHOCYTES # BLD AUTO: 2.7 10*3/MM3 (ref 0.7–3.1)
LYMPHOCYTES NFR BLD AUTO: 40.5 % (ref 19.6–45.3)
MCH RBC QN AUTO: 33.2 PG (ref 26.6–33)
MCHC RBC AUTO-ENTMCNC: 33.6 G/DL (ref 31.5–35.7)
MCV RBC AUTO: 98.9 FL (ref 79–97)
MONOCYTES # BLD AUTO: 0.4 10*3/MM3 (ref 0.1–0.9)
MONOCYTES NFR BLD AUTO: 6.5 % (ref 5–12)
NEUTROPHILS NFR BLD AUTO: 3.3 10*3/MM3 (ref 1.7–7)
NEUTROPHILS NFR BLD AUTO: 50.3 % (ref 42.7–76)
NITRITE UR QL STRIP: NEGATIVE
NRBC BLD AUTO-RTO: 0.3 /100 WBC (ref 0–0.2)
PH UR STRIP.AUTO: 6.5 [PH] (ref 5–8)
PLATELET # BLD AUTO: 150 10*3/MM3 (ref 140–450)
PMV BLD AUTO: 10.4 FL (ref 6–12)
PROT UR QL STRIP: NEGATIVE
RBC # BLD AUTO: 4.37 10*6/MM3 (ref 3.77–5.28)
SP GR UR STRIP: 1.02 (ref 1–1.03)
UROBILINOGEN UR QL STRIP: ABNORMAL
WBC NRBC COR # BLD: 6.6 10*3/MM3 (ref 3.4–10.8)

## 2022-02-05 PROCEDURE — 85652 RBC SED RATE AUTOMATED: CPT | Performed by: EMERGENCY MEDICINE

## 2022-02-05 PROCEDURE — 81003 URINALYSIS AUTO W/O SCOPE: CPT | Performed by: EMERGENCY MEDICINE

## 2022-02-05 PROCEDURE — 87040 BLOOD CULTURE FOR BACTERIA: CPT | Performed by: EMERGENCY MEDICINE

## 2022-02-05 PROCEDURE — 93005 ELECTROCARDIOGRAM TRACING: CPT

## 2022-02-05 PROCEDURE — 85025 COMPLETE CBC W/AUTO DIFF WBC: CPT | Performed by: EMERGENCY MEDICINE

## 2022-02-05 PROCEDURE — 0202U NFCT DS 22 TRGT SARS-COV-2: CPT | Performed by: EMERGENCY MEDICINE

## 2022-02-05 PROCEDURE — 86140 C-REACTIVE PROTEIN: CPT | Performed by: EMERGENCY MEDICINE

## 2022-02-05 PROCEDURE — 99283 EMERGENCY DEPT VISIT LOW MDM: CPT

## 2022-02-05 PROCEDURE — 93005 ELECTROCARDIOGRAM TRACING: CPT | Performed by: EMERGENCY MEDICINE

## 2022-02-05 PROCEDURE — 85379 FIBRIN DEGRADATION QUANT: CPT | Performed by: EMERGENCY MEDICINE

## 2022-02-05 PROCEDURE — 71045 X-RAY EXAM CHEST 1 VIEW: CPT

## 2022-02-06 VITALS
TEMPERATURE: 98.7 F | DIASTOLIC BLOOD PRESSURE: 60 MMHG | RESPIRATION RATE: 16 BRPM | OXYGEN SATURATION: 98 % | HEART RATE: 69 BPM | BODY MASS INDEX: 35.68 KG/M2 | WEIGHT: 169.97 LBS | HEIGHT: 58 IN | SYSTOLIC BLOOD PRESSURE: 101 MMHG

## 2022-02-06 LAB
ALBUMIN SERPL-MCNC: 3.8 G/DL (ref 3.5–5.2)
ALBUMIN/GLOB SERPL: 1.5 G/DL
ALP SERPL-CCNC: 72 U/L (ref 39–117)
ALT SERPL W P-5'-P-CCNC: 17 U/L (ref 1–33)
ANION GAP SERPL CALCULATED.3IONS-SCNC: 11 MMOL/L (ref 5–15)
AST SERPL-CCNC: 23 U/L (ref 1–32)
B PARAPERT DNA SPEC QL NAA+PROBE: NOT DETECTED
B PERT DNA SPEC QL NAA+PROBE: NOT DETECTED
BILIRUB SERPL-MCNC: 0.2 MG/DL (ref 0–1.2)
BUN SERPL-MCNC: 11 MG/DL (ref 6–20)
BUN/CREAT SERPL: 15.1 (ref 7–25)
C PNEUM DNA NPH QL NAA+NON-PROBE: NOT DETECTED
CALCIUM SPEC-SCNC: 8.6 MG/DL (ref 8.6–10.5)
CHLORIDE SERPL-SCNC: 103 MMOL/L (ref 98–107)
CO2 SERPL-SCNC: 24 MMOL/L (ref 22–29)
CREAT SERPL-MCNC: 0.73 MG/DL (ref 0.57–1)
CRP SERPL-MCNC: 0.34 MG/DL (ref 0–0.5)
D DIMER PPP FEU-MCNC: 0.49 MG/L (FEU) (ref 0–0.59)
ERYTHROCYTE [SEDIMENTATION RATE] IN BLOOD: 26 MM/HR (ref 0–20)
FLUAV SUBTYP SPEC NAA+PROBE: NOT DETECTED
FLUBV RNA ISLT QL NAA+PROBE: NOT DETECTED
GFR SERPL CREATININE-BSD FRML MDRD: 91 ML/MIN/1.73
GLOBULIN UR ELPH-MCNC: 2.6 GM/DL
GLUCOSE SERPL-MCNC: 95 MG/DL (ref 65–99)
HADV DNA SPEC NAA+PROBE: NOT DETECTED
HCOV 229E RNA SPEC QL NAA+PROBE: NOT DETECTED
HCOV HKU1 RNA SPEC QL NAA+PROBE: NOT DETECTED
HCOV NL63 RNA SPEC QL NAA+PROBE: NOT DETECTED
HCOV OC43 RNA SPEC QL NAA+PROBE: NOT DETECTED
HMPV RNA NPH QL NAA+NON-PROBE: NOT DETECTED
HPIV1 RNA ISLT QL NAA+PROBE: NOT DETECTED
HPIV2 RNA SPEC QL NAA+PROBE: NOT DETECTED
HPIV3 RNA NPH QL NAA+PROBE: NOT DETECTED
HPIV4 P GENE NPH QL NAA+PROBE: NOT DETECTED
M PNEUMO IGG SER IA-ACNC: NOT DETECTED
POTASSIUM SERPL-SCNC: 3.8 MMOL/L (ref 3.5–5.2)
PROCALCITONIN SERPL-MCNC: <0.02 NG/ML (ref 0–0.25)
PROT SERPL-MCNC: 6.4 G/DL (ref 6–8.5)
QT INTERVAL: 391 MS
RHINOVIRUS RNA SPEC NAA+PROBE: NOT DETECTED
RSV RNA NPH QL NAA+NON-PROBE: NOT DETECTED
SARS-COV-2 RNA NPH QL NAA+NON-PROBE: DETECTED
SODIUM SERPL-SCNC: 138 MMOL/L (ref 136–145)
TROPONIN T SERPL-MCNC: <0.01 NG/ML (ref 0–0.03)

## 2022-02-06 PROCEDURE — 87040 BLOOD CULTURE FOR BACTERIA: CPT | Performed by: EMERGENCY MEDICINE

## 2022-02-06 PROCEDURE — 36415 COLL VENOUS BLD VENIPUNCTURE: CPT

## 2022-02-06 PROCEDURE — 80053 COMPREHEN METABOLIC PANEL: CPT | Performed by: EMERGENCY MEDICINE

## 2022-02-06 PROCEDURE — 84484 ASSAY OF TROPONIN QUANT: CPT | Performed by: EMERGENCY MEDICINE

## 2022-02-06 PROCEDURE — 84145 PROCALCITONIN (PCT): CPT | Performed by: EMERGENCY MEDICINE

## 2022-02-06 RX ADMIN — SODIUM CHLORIDE 1000 ML: 9 INJECTION, SOLUTION INTRAVENOUS at 01:38

## 2022-02-06 NOTE — ED NOTES
"Pt reports having random fevers for most days since the end of December of 2021. Pt sees a urologist, pulmonologist, and pmd for multiple medical issues. Pt has a 1 1/2\" at bedside of all medical exam reports and results for review.     Yazmin Johnson, RN  02/06/22 0112    "

## 2022-02-06 NOTE — ED PROVIDER NOTES
Subjective   36-year-old female with daily fevers for the past 6 weeks.  Patient tells me her temperature was 1/1/2020 at 7 PM and she took ibuprofen.  Patient has had associated cough congestion pleuritic chest and back pain.  There has also been associated dysuria.  Patient was treated for UTI in early January with doxycycline but states she never got better.  Patient denies any weight change.  Patient got dizzy and fell earlier in the week but did not lose consciousness and complains of only a mild headache currently.  No abdominal pain, no GI symptoms.          Review of Systems   Constitutional: Positive for fatigue and fever.   Respiratory: Positive for cough and shortness of breath.    Cardiovascular: Positive for chest pain.   Neurological: Positive for light-headedness and headaches.   All other systems reviewed and are negative.      Past Medical History:   Diagnosis Date   • Anxiety    • Arthritis    • Cancer (HCC)     cervical 2003   • COPD (chronic obstructive pulmonary disease) (HCC)    • Depression    • Enlarged liver    • Genital herpes    • GERD (gastroesophageal reflux disease)    • Granulomatosis    • High cholesterol    • Joint pain    • Knee pain    • Leg pain     left-- with sharp pains and numbness   • Low back pain    • Lung nodules    • MRSA infection     2010    • Neck pain    • Numbness and tingling of both feet        Allergies   Allergen Reactions   • Penicillins Anaphylaxis, Hives and Swelling       Past Surgical History:   Procedure Laterality Date   • BREAST SURGERY      rt-- mRSA infection   • COLONOSCOPY N/A 1/21/2022    Procedure: COLONOSCOPY with large intestine tissue biopsy, rectal polyps, internal hemorrhoids;  Surgeon: Alberto Pearson MD;  Location: [facility] ENDOSCOPY;  Service: Gastroenterology;  Laterality: N/A;  post op: random large intestine tissue biopsy, rectum polyp x5   • ENDOSCOPY     • FINGER FRACTURE SURGERY      rt little  finger   • HYSTERECTOMY     • OTHER  SURGICAL HISTORY      leep procedure- removed cancer cells -- 2003   • TUBAL ABDOMINAL LIGATION         Family History   Family history unknown: Yes       Social History     Socioeconomic History   • Marital status: Single   Tobacco Use   • Smoking status: Current Every Day Smoker     Packs/day: 1.00     Types: Cigarettes   • Smokeless tobacco: Never Used   Vaping Use   • Vaping Use: Never used   Substance and Sexual Activity   • Alcohol use: Not Currently   • Drug use: Defer     Comment: 1/24/22 uses delta 8- pt stated for pain   • Sexual activity: Defer           Objective   Physical Exam  Constitutional:       Appearance: Normal appearance.   HENT:      Head: Normocephalic and atraumatic.      Right Ear: Tympanic membrane normal.      Left Ear: Tympanic membrane normal.      Mouth/Throat:      Mouth: Mucous membranes are moist.      Pharynx: Oropharynx is clear.   Eyes:      Extraocular Movements: Extraocular movements intact.      Conjunctiva/sclera: Conjunctivae normal.      Pupils: Pupils are equal, round, and reactive to light.   Cardiovascular:      Rate and Rhythm: Normal rate and regular rhythm.      Heart sounds: Normal heart sounds.   Pulmonary:      Effort: Pulmonary effort is normal.      Breath sounds: Normal breath sounds.   Abdominal:      General: Bowel sounds are normal. There is no distension.      Palpations: Abdomen is soft.      Tenderness: There is no abdominal tenderness.   Musculoskeletal:         General: No swelling or tenderness. Normal range of motion.      Cervical back: Normal range of motion and neck supple.   Skin:     General: Skin is warm and dry.      Capillary Refill: Capillary refill takes less than 2 seconds.   Neurological:      Mental Status: She is alert and oriented to person, place, and time.      Cranial Nerves: No cranial nerve deficit.      Sensory: No sensory deficit.      Motor: No weakness.   Psychiatric:         Mood and Affect: Mood normal.         Behavior:  Behavior normal.         Procedures           ED Course  ED Course as of 02/06/22 0144   Sat Feb 05, 2022   2234 EKG interpretation:  NSR, rate 66, no acute st change [JR]      ED Course User Index  [JR] Nikolay Harrison MD                                                 MDM  Number of Diagnoses or Management Options  COVID  Diagnosis management comments: Results for orders placed or performed during the hospital encounter of 02/05/22  -Respiratory Panel PCR w/COVID-19(SARS-CoV-2) [facility] In-House, NP Swab in UTM/VTM, 3-4 HR TAT - Swab, Nasopharynx:   Specimen: Nasopharynx; Swab       Result                      Value             Ref Range           ADENOVIRUS, PCR             Not Detected      Not Detected        Coronavirus 229E            Not Detected      Not Detected        Coronavirus HKU1            Not Detected      Not Detected        Coronavirus NL63            Not Detected      Not Detected        Coronavirus OC43            Not Detected      Not Detected        COVID19                     Detected (C)      Not Detected*       Human Metapneumovirus       Not Detected      Not Detected        Human Rhinovirus/Enter*     Not Detected      Not Detected        Influenza A PCR             Not Detected      Not Detected        Influenza B PCR             Not Detected      Not Detected        Parainfluenza Virus 1       Not Detected      Not Detected        Parainfluenza Virus 2       Not Detected      Not Detected        Parainfluenza Virus 3       Not Detected      Not Detected        Parainfluenza Virus 4       Not Detected      Not Detected        RSV, PCR                    Not Detected      Not Detected        Bordetella pertussis p*     Not Detected      Not Detected        Bordetella parapertuss*     Not Detected      Not Detected        Chlamydophila pneumoni*     Not Detected      Not Detected        Mycoplasma pneumo by P*     Not Detected      Not Detected   -Comprehensive  Metabolic Panel:   Specimen: Arm, Left; Blood       Result                      Value             Ref Range           Glucose                     95                65 - 99 mg/dL       BUN                         11                6 - 20 mg/dL        Creatinine                  0.73              0.57 - 1.00 *       Sodium                      138               136 - 145 mm*       Potassium                   3.8               3.5 - 5.2 mm*       Chloride                    103               98 - 107 mmo*       CO2                         24.0              22.0 - 29.0 *       Calcium                     8.6               8.6 - 10.5 m*       Total Protein               6.4               6.0 - 8.5 g/*       Albumin                     3.80              3.50 - 5.20 *       ALT (SGPT)                  17                1 - 33 U/L          AST (SGOT)                  23                1 - 32 U/L          Alkaline Phosphatase        72                39 - 117 U/L        Total Bilirubin             0.2               0.0 - 1.2 mg*       eGFR Non African Amer       91                >60 mL/min/1*       Globulin                    2.6               gm/dL               A/G Ratio                   1.5               g/dL                BUN/Creatinine Ratio        15.1              7.0 - 25.0          Anion Gap                   11.0              5.0 - 15.0 m*  -Urinalysis With Culture If Indicated - Urine, Catheter In/Out:   Specimen: Urine, Catheter In/Out       Result                      Value             Ref Range           Color, UA                   Yellow            Yellow, Straw       Appearance, UA              Clear             Clear               pH, UA                      6.5               5.0 - 8.0           Specific Gravity, UA        1.022             1.005 - 1.030       Glucose, UA                 Negative          Negative            Ketones, UA                 Trace (A)         Negative            Bilirubin, UA                Negative          Negative            Blood, UA                   Negative          Negative            Protein, UA                 Negative          Negative            Leuk Esterase, UA           Negative          Negative            Nitrite, UA                 Negative          Negative            Urobilinogen, UA            1.0 E.U./dL       0.2 - 1.0 E.*  -Sedimentation Rate:   Specimen: Arm, Right; Blood       Result                      Value             Ref Range           Sed Rate                    26 (H)            0 - 20 mm/hr   -Procalcitonin:   Specimen: Arm, Left; Blood       Result                      Value             Ref Range           Procalcitonin               <0.02             0.00 - 0.25 *  -C-reactive Protein:   Specimen: Arm, Right; Blood       Result                      Value             Ref Range           C-Reactive Protein          0.34              0.00 - 0.50 *  -Troponin:   Specimen: Arm, Left; Blood       Result                      Value             Ref Range           Troponin T                  <0.010            0.000 - 0.03*  -CBC Auto Differential:   Specimen: Arm, Right; Blood       Result                      Value             Ref Range           WBC                         6.60              3.40 - 10.80*       RBC                         4.37              3.77 - 5.28 *       Hemoglobin                  14.5              12.0 - 15.9 *       Hematocrit                  43.2              34.0 - 46.6 %       MCV                         98.9 (H)          79.0 - 97.0 *       MCH                         33.2 (H)          26.6 - 33.0 *       MCHC                        33.6              31.5 - 35.7 *       RDW                         13.3              12.3 - 15.4 %       RDW-SD                      46.4              37.0 - 54.0 *       MPV                         10.4              6.0 - 12.0 fL       Platelets                   150               140 - 450 10*        Neutrophil %                50.3              42.7 - 76.0 %       Lymphocyte %                40.5              19.6 - 45.3 %       Monocyte %                  6.5               5.0 - 12.0 %        Eosinophil %                1.6               0.3 - 6.2 %         Basophil %                  1.1               0.0 - 1.5 %         Neutrophils, Absolute       3.30              1.70 - 7.00 *       Lymphocytes, Absolute       2.70              0.70 - 3.10 *       Monocytes, Absolute         0.40              0.10 - 0.90 *       Eosinophils, Absolute       0.10              0.00 - 0.40 *       Basophils, Absolute         0.10              0.00 - 0.20 *       nRBC                        0.3 (H)           0.0 - 0.2 /1*  -D-dimer, Quantitative:   Specimen: Arm, Right; Blood       Result                      Value             Ref Range           D-Dimer, Quantitative       0.49              0.00 - 0.59 *  -ECG 12 Lead:        Result                      Value             Ref Range           QT Interval                 391               ms               Patient well, ambulates well without any dizziness, hemodynamically stable, positive for COVID.  Patient tells me her respiratory symptoms have been present for 2 weeks.  Afebrile here.  Stable for follow-up with her PCP.       Amount and/or Complexity of Data Reviewed  Clinical lab tests: ordered and reviewed  Tests in the radiology section of CPT®: ordered and reviewed  Tests in the medicine section of CPT®: reviewed and ordered  Independent visualization of images, tracings, or specimens: yes        Final diagnoses:   COVID       ED Disposition  ED Disposition     ED Disposition Condition Comment    Discharge Stable           Edwar Correa MD  [address]  [place] [address]  [phone]    Schedule an appointment as soon as possible for a visit            Medication List      No changes were made to your prescriptions during this visit.           Nikolay Harrison MD  02/06/22 0144

## 2022-02-10 ENCOUNTER — PATIENT MESSAGE (OUTPATIENT)
Dept: FAMILY MEDICINE CLINIC | Facility: CLINIC | Age: 37
End: 2022-02-10

## 2022-02-11 LAB
BACTERIA SPEC AEROBE CULT: NORMAL
BACTERIA SPEC AEROBE CULT: NORMAL

## 2022-02-14 DIAGNOSIS — M47.812 CERVICAL SPONDYLOSIS WITHOUT MYELOPATHY: ICD-10-CM

## 2022-02-14 DIAGNOSIS — M54.16 LUMBAR RADICULITIS: ICD-10-CM

## 2022-02-14 DIAGNOSIS — M47.817 LUMBOSACRAL SPONDYLOSIS WITHOUT MYELOPATHY: Primary | ICD-10-CM

## 2022-02-15 ENCOUNTER — HOSPITAL ENCOUNTER (OUTPATIENT)
Dept: CARDIOLOGY | Facility: HOSPITAL | Age: 37
Discharge: HOME | End: 2022-02-15

## 2022-02-15 ENCOUNTER — HOSPITAL ENCOUNTER (OUTPATIENT)
Dept: CT IMAGING | Facility: HOSPITAL | Age: 37
Discharge: HOME | End: 2022-02-15

## 2022-02-15 VITALS
DIASTOLIC BLOOD PRESSURE: 61 MMHG | HEIGHT: 58 IN | WEIGHT: 165 LBS | BODY MASS INDEX: 34.63 KG/M2 | SYSTOLIC BLOOD PRESSURE: 100 MMHG

## 2022-02-15 DIAGNOSIS — R91.1 INCIDENTAL LUNG NODULE, > 3MM AND < 8MM: ICD-10-CM

## 2022-02-15 DIAGNOSIS — R06.02 SHORTNESS OF BREATH: ICD-10-CM

## 2022-02-15 PROCEDURE — 93306 TTE W/DOPPLER COMPLETE: CPT | Performed by: INTERNAL MEDICINE

## 2022-02-15 PROCEDURE — 71250 CT THORAX DX C-: CPT

## 2022-02-15 PROCEDURE — 93306 TTE W/DOPPLER COMPLETE: CPT

## 2022-02-17 LAB
BH CV ECHO MEAS - % IVS THICK: 41.6 %
BH CV ECHO MEAS - % LVPW THICK: 42.8 %
BH CV ECHO MEAS - ACS: 1.8 CM
BH CV ECHO MEAS - AO MAX PG (FULL): 2.6 MMHG
BH CV ECHO MEAS - AO MAX PG: 7.2 MMHG
BH CV ECHO MEAS - AO MEAN PG (FULL): 1.7 MMHG
BH CV ECHO MEAS - AO MEAN PG: 4.2 MMHG
BH CV ECHO MEAS - AO ROOT AREA (BSA CORRECTED): 1.6
BH CV ECHO MEAS - AO ROOT AREA: 5.8 CM^2
BH CV ECHO MEAS - AO ROOT DIAM: 2.7 CM
BH CV ECHO MEAS - AO V2 MAX: 134.5 CM/SEC
BH CV ECHO MEAS - AO V2 MEAN: 98.8 CM/SEC
BH CV ECHO MEAS - AO V2 VTI: 30.5 CM
BH CV ECHO MEAS - ASC AORTA: 2.4 CM
BH CV ECHO MEAS - AVA(I,A): 2 CM^2
BH CV ECHO MEAS - AVA(I,D): 2 CM^2
BH CV ECHO MEAS - AVA(V,A): 2 CM^2
BH CV ECHO MEAS - AVA(V,D): 2 CM^2
BH CV ECHO MEAS - BSA(HAYCOCK): 1.8 M^2
BH CV ECHO MEAS - BSA: 1.7 M^2
BH CV ECHO MEAS - BZI_BMI: 34.5 KILOGRAMS/M^2
BH CV ECHO MEAS - BZI_METRIC_HEIGHT: 147.3 CM
BH CV ECHO MEAS - BZI_METRIC_WEIGHT: 74.8 KG
BH CV ECHO MEAS - EDV(CUBED): 51.7 ML
BH CV ECHO MEAS - EDV(MOD-SP4): 70.1 ML
BH CV ECHO MEAS - EDV(TEICH): 59.1 ML
BH CV ECHO MEAS - EF(CUBED): 64.4 %
BH CV ECHO MEAS - EF(MOD-BP): 70 %
BH CV ECHO MEAS - EF(MOD-SP4): 69.6 %
BH CV ECHO MEAS - EF(TEICH): 56.7 %
BH CV ECHO MEAS - ESV(CUBED): 18.4 ML
BH CV ECHO MEAS - ESV(MOD-SP4): 21.3 ML
BH CV ECHO MEAS - ESV(TEICH): 25.5 ML
BH CV ECHO MEAS - FS: 29.1 %
BH CV ECHO MEAS - IVS/LVPW: 1
BH CV ECHO MEAS - IVSD: 1.1 CM
BH CV ECHO MEAS - IVSS: 1.6 CM
BH CV ECHO MEAS - LA DIMENSION(2D): 3.5 CM
BH CV ECHO MEAS - LV DIASTOLIC VOL/BSA (35-75): 41.8 ML/M^2
BH CV ECHO MEAS - LV MASS(C)D: 129.9 GRAMS
BH CV ECHO MEAS - LV MASS(C)DI: 77.4 GRAMS/M^2
BH CV ECHO MEAS - LV MASS(C)S: 143.9 GRAMS
BH CV ECHO MEAS - LV MASS(C)SI: 85.7 GRAMS/M^2
BH CV ECHO MEAS - LV MAX PG: 4.6 MMHG
BH CV ECHO MEAS - LV MEAN PG: 2.6 MMHG
BH CV ECHO MEAS - LV SYSTOLIC VOL/BSA (12-30): 12.7 ML/M^2
BH CV ECHO MEAS - LV V1 MAX: 107.5 CM/SEC
BH CV ECHO MEAS - LV V1 MEAN: 75.6 CM/SEC
BH CV ECHO MEAS - LV V1 VTI: 24.6 CM
BH CV ECHO MEAS - LVIDD: 3.7 CM
BH CV ECHO MEAS - LVIDS: 2.6 CM
BH CV ECHO MEAS - LVOT AREA: 2.5 CM^2
BH CV ECHO MEAS - LVOT DIAM: 1.8 CM
BH CV ECHO MEAS - LVPWD: 1.1 CM
BH CV ECHO MEAS - LVPWS: 1.5 CM
BH CV ECHO MEAS - MV A MAX VEL: 59.4 CM/SEC
BH CV ECHO MEAS - MV DEC SLOPE: 572.4 CM/SEC^2
BH CV ECHO MEAS - MV DEC TIME: 0.16 SEC
BH CV ECHO MEAS - MV E MAX VEL: 90.7 CM/SEC
BH CV ECHO MEAS - MV E/A: 1.5
BH CV ECHO MEAS - MV MAX PG: 4.4 MMHG
BH CV ECHO MEAS - MV MEAN PG: 1.3 MMHG
BH CV ECHO MEAS - MV V2 MAX: 104.5 CM/SEC
BH CV ECHO MEAS - MV V2 MEAN: 51.3 CM/SEC
BH CV ECHO MEAS - MV V2 VTI: 30.4 CM
BH CV ECHO MEAS - MVA(VTI): 2 CM^2
BH CV ECHO MEAS - PA ACC TIME: 0.13 SEC
BH CV ECHO MEAS - PA MAX PG (FULL): 1.1 MMHG
BH CV ECHO MEAS - PA MAX PG: 3.6 MMHG
BH CV ECHO MEAS - PA MEAN PG (FULL): 0.55 MMHG
BH CV ECHO MEAS - PA MEAN PG: 1.8 MMHG
BH CV ECHO MEAS - PA PR(ACCEL): 18.3 MMHG
BH CV ECHO MEAS - PA V2 MAX: 94.2 CM/SEC
BH CV ECHO MEAS - PA V2 MEAN: 63.3 CM/SEC
BH CV ECHO MEAS - PA V2 VTI: 18 CM
BH CV ECHO MEAS - PULM A REVS DUR: 0.08 SEC
BH CV ECHO MEAS - PULM A REVS VEL: 24.9 CM/SEC
BH CV ECHO MEAS - PULM DIAS VEL: 42.1 CM/SEC
BH CV ECHO MEAS - PULM S/D: 1.4
BH CV ECHO MEAS - PULM SYS VEL: 57.6 CM/SEC
BH CV ECHO MEAS - RAP SYSTOLE: 3 MMHG
BH CV ECHO MEAS - RV MAX PG: 2.4 MMHG
BH CV ECHO MEAS - RV MEAN PG: 1.3 MMHG
BH CV ECHO MEAS - RV V1 MAX: 78.1 CM/SEC
BH CV ECHO MEAS - RV V1 MEAN: 52.3 CM/SEC
BH CV ECHO MEAS - RV V1 VTI: 17.1 CM
BH CV ECHO MEAS - RVDD: 2.7 CM
BH CV ECHO MEAS - RVSP: 21.6 MMHG
BH CV ECHO MEAS - SI(AO): 106 ML/M^2
BH CV ECHO MEAS - SI(CUBED): 19.8 ML/M^2
BH CV ECHO MEAS - SI(LVOT): 37.1 ML/M^2
BH CV ECHO MEAS - SI(MOD-SP4): 29.1 ML/M^2
BH CV ECHO MEAS - SI(TEICH): 20 ML/M^2
BH CV ECHO MEAS - SV(AO): 177.9 ML
BH CV ECHO MEAS - SV(CUBED): 33.3 ML
BH CV ECHO MEAS - SV(LVOT): 62.2 ML
BH CV ECHO MEAS - SV(MOD-SP4): 48.8 ML
BH CV ECHO MEAS - SV(TEICH): 33.5 ML
BH CV ECHO MEAS - TR MAX VEL: 215.5 CM/SEC
LV EF 2D ECHO EST: 70 %
MAXIMAL PREDICTED HEART RATE: 185 BPM
STRESS TARGET HR: 157 BPM

## 2022-03-02 ENCOUNTER — APPOINTMENT (OUTPATIENT)
Dept: PAIN MEDICINE | Facility: HOSPITAL | Age: 37
End: 2022-03-02

## 2022-03-08 ENCOUNTER — TELEPHONE (OUTPATIENT)
Dept: PULMONOLOGY | Facility: HOSPITAL | Age: 37
End: 2022-03-08

## 2022-03-08 NOTE — TELEPHONE ENCOUNTER
Pt called back to reschedule her appt  to 4/20/2022 since her sleep study is not scheduled until next week. She asked about echo since she has been having issues since she had COVID (low BP as low as 90/40, light headed, dizziness) Please advise?

## 2022-03-15 ENCOUNTER — PATIENT MESSAGE (OUTPATIENT)
Dept: FAMILY MEDICINE CLINIC | Facility: CLINIC | Age: 37
End: 2022-03-15

## 2022-03-15 DIAGNOSIS — D71 CHRONIC GRANULOMATOUS DISEASE: ICD-10-CM

## 2022-03-15 RX ORDER — PHENTERMINE HYDROCHLORIDE 37.5 MG/1
37.5 TABLET ORAL
Qty: 30 TABLET | Refills: 0 | Status: SHIPPED | OUTPATIENT
Start: 2022-03-15 | End: 2022-05-04 | Stop reason: SDUPTHER

## 2022-05-04 ENCOUNTER — PATIENT MESSAGE (OUTPATIENT)
Dept: FAMILY MEDICINE CLINIC | Facility: CLINIC | Age: 37
End: 2022-05-04

## 2022-05-04 DIAGNOSIS — D71 CHRONIC GRANULOMATOUS DISEASE: ICD-10-CM

## 2022-05-04 RX ORDER — PHENTERMINE HYDROCHLORIDE 37.5 MG/1
37.5 TABLET ORAL
Qty: 30 TABLET | Refills: 0 | Status: SHIPPED | OUTPATIENT
Start: 2022-05-04 | End: 2022-06-14 | Stop reason: SDUPTHER
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
## 2022-05-13 ENCOUNTER — APPOINTMENT (OUTPATIENT)
Dept: NEUROLOGY | Facility: HOSPITAL | Age: 37
End: 2022-05-13

## 2022-06-14 ENCOUNTER — PATIENT MESSAGE (OUTPATIENT)
Dept: FAMILY MEDICINE CLINIC | Facility: CLINIC | Age: 37
End: 2022-06-14

## 2022-06-14 DIAGNOSIS — D71 CHRONIC GRANULOMATOUS DISEASE: ICD-10-CM

## 2022-06-14 RX ORDER — PHENTERMINE HYDROCHLORIDE 37.5 MG/1
37.5 TABLET ORAL
Qty: 30 TABLET | Refills: 0 | Status: SHIPPED | OUTPATIENT
Start: 2022-06-14 | End: 2023-03-02

## 2023-02-15 ENCOUNTER — LAB (OUTPATIENT)
Dept: FAMILY MEDICINE CLINIC | Facility: CLINIC | Age: 38
End: 2023-02-15
Payer: MEDICAID

## 2023-02-15 ENCOUNTER — OFFICE VISIT (OUTPATIENT)
Dept: FAMILY MEDICINE CLINIC | Facility: CLINIC | Age: 38
End: 2023-02-15
Payer: MEDICAID

## 2023-02-15 VITALS
BODY MASS INDEX: 34.9 KG/M2 | TEMPERATURE: 96.4 F | DIASTOLIC BLOOD PRESSURE: 72 MMHG | SYSTOLIC BLOOD PRESSURE: 106 MMHG | OXYGEN SATURATION: 96 % | HEART RATE: 68 BPM | WEIGHT: 167 LBS

## 2023-02-15 DIAGNOSIS — M19.90 ARTHRITIS: ICD-10-CM

## 2023-02-15 DIAGNOSIS — G89.29 CHRONIC PAIN OF RIGHT KNEE: ICD-10-CM

## 2023-02-15 DIAGNOSIS — R20.0 BILATERAL HAND NUMBNESS: Primary | ICD-10-CM

## 2023-02-15 DIAGNOSIS — G89.29 CHRONIC NECK PAIN: ICD-10-CM

## 2023-02-15 DIAGNOSIS — M54.2 CHRONIC NECK PAIN: ICD-10-CM

## 2023-02-15 DIAGNOSIS — G89.29 CHRONIC MIDLINE LOW BACK PAIN WITH LEFT-SIDED SCIATICA: ICD-10-CM

## 2023-02-15 DIAGNOSIS — M25.561 CHRONIC PAIN OF RIGHT KNEE: ICD-10-CM

## 2023-02-15 DIAGNOSIS — M54.42 CHRONIC MIDLINE LOW BACK PAIN WITH LEFT-SIDED SCIATICA: ICD-10-CM

## 2023-02-15 DIAGNOSIS — R06.09 DYSPNEA ON EXERTION: ICD-10-CM

## 2023-02-15 LAB
ALBUMIN SERPL-MCNC: 4.5 G/DL (ref 3.5–5.2)
ALBUMIN/GLOB SERPL: 1.5 G/DL
ALP SERPL-CCNC: 97 U/L (ref 39–117)
ALT SERPL W P-5'-P-CCNC: 20 U/L (ref 1–33)
ANION GAP SERPL CALCULATED.3IONS-SCNC: 6 MMOL/L (ref 5–15)
AST SERPL-CCNC: 20 U/L (ref 1–32)
BASOPHILS # BLD AUTO: 0.06 10*3/MM3 (ref 0–0.2)
BASOPHILS NFR BLD AUTO: 0.6 % (ref 0–1.5)
BILIRUB SERPL-MCNC: 0.2 MG/DL (ref 0–1.2)
BUN SERPL-MCNC: 6 MG/DL (ref 6–20)
BUN/CREAT SERPL: 7.8 (ref 7–25)
CALCIUM SPEC-SCNC: 9.5 MG/DL (ref 8.6–10.5)
CHLORIDE SERPL-SCNC: 104 MMOL/L (ref 98–107)
CHROMATIN AB SERPL-ACNC: <10 IU/ML (ref 0–14)
CO2 SERPL-SCNC: 29 MMOL/L (ref 22–29)
CREAT SERPL-MCNC: 0.77 MG/DL (ref 0.57–1)
DEPRECATED RDW RBC AUTO: 48 FL (ref 37–54)
EGFRCR SERPLBLD CKD-EPI 2021: 102.7 ML/MIN/1.73
EOSINOPHIL # BLD AUTO: 0.2 10*3/MM3 (ref 0–0.4)
EOSINOPHIL NFR BLD AUTO: 2 % (ref 0.3–6.2)
ERYTHROCYTE [DISTWIDTH] IN BLOOD BY AUTOMATED COUNT: 13.1 % (ref 12.3–15.4)
ERYTHROCYTE [SEDIMENTATION RATE] IN BLOOD: 19 MM/HR (ref 0–20)
GLOBULIN UR ELPH-MCNC: 3.1 GM/DL
GLUCOSE SERPL-MCNC: 73 MG/DL (ref 65–99)
HCT VFR BLD AUTO: 46.3 % (ref 34–46.6)
HGB BLD-MCNC: 15.5 G/DL (ref 12–15.9)
IMM GRANULOCYTES # BLD AUTO: 0.02 10*3/MM3 (ref 0–0.05)
IMM GRANULOCYTES NFR BLD AUTO: 0.2 % (ref 0–0.5)
LYMPHOCYTES # BLD AUTO: 3.08 10*3/MM3 (ref 0.7–3.1)
LYMPHOCYTES NFR BLD AUTO: 30.1 % (ref 19.6–45.3)
MCH RBC QN AUTO: 33.3 PG (ref 26.6–33)
MCHC RBC AUTO-ENTMCNC: 33.5 G/DL (ref 31.5–35.7)
MCV RBC AUTO: 99.6 FL (ref 79–97)
MONOCYTES # BLD AUTO: 0.59 10*3/MM3 (ref 0.1–0.9)
MONOCYTES NFR BLD AUTO: 5.8 % (ref 5–12)
NEUTROPHILS NFR BLD AUTO: 6.29 10*3/MM3 (ref 1.7–7)
NEUTROPHILS NFR BLD AUTO: 61.3 % (ref 42.7–76)
NRBC BLD AUTO-RTO: 0 /100 WBC (ref 0–0.2)
PLATELET # BLD AUTO: 203 10*3/MM3 (ref 140–450)
PMV BLD AUTO: 11.7 FL (ref 6–12)
POTASSIUM SERPL-SCNC: 4.4 MMOL/L (ref 3.5–5.2)
PROT SERPL-MCNC: 7.6 G/DL (ref 6–8.5)
RBC # BLD AUTO: 4.65 10*6/MM3 (ref 3.77–5.28)
SODIUM SERPL-SCNC: 139 MMOL/L (ref 136–145)
WBC NRBC COR # BLD: 10.24 10*3/MM3 (ref 3.4–10.8)

## 2023-02-15 PROCEDURE — 86225 DNA ANTIBODY NATIVE: CPT | Performed by: FAMILY MEDICINE

## 2023-02-15 PROCEDURE — 86235 NUCLEAR ANTIGEN ANTIBODY: CPT | Performed by: FAMILY MEDICINE

## 2023-02-15 PROCEDURE — 99214 OFFICE O/P EST MOD 30 MIN: CPT | Performed by: FAMILY MEDICINE

## 2023-02-15 PROCEDURE — 85652 RBC SED RATE AUTOMATED: CPT | Performed by: FAMILY MEDICINE

## 2023-02-15 PROCEDURE — 85025 COMPLETE CBC W/AUTO DIFF WBC: CPT | Performed by: FAMILY MEDICINE

## 2023-02-15 PROCEDURE — 80053 COMPREHEN METABOLIC PANEL: CPT | Performed by: FAMILY MEDICINE

## 2023-02-15 PROCEDURE — 86431 RHEUMATOID FACTOR QUANT: CPT | Performed by: FAMILY MEDICINE

## 2023-02-15 PROCEDURE — 36415 COLL VENOUS BLD VENIPUNCTURE: CPT | Performed by: FAMILY MEDICINE

## 2023-02-15 NOTE — PROGRESS NOTES
Subjective   Rand Schilling is a 37 y.o. female.     History of Present Illness  Rand Schilling is in for follow up on several chronic issues.  She has suffered with some numbness in her hands and we were trying to get her to the local hand surgery center and get EMGs and she needs those reordered.  She has been seeing pain management for pain in her neck and lower back and she feels things are not improving and she would like updated scans.  Pain management has referred her to physical therapy but she has yet to start that.  She is struggling to lose weight and feels she cannot exercise adequately because of her pain.  She has used phentermine in the past and would like that again I informed her that is not a good long-term strategy.  We had tried treating her last year but then she developed some transportation problems and she put several things on hold as a result.. There is no history of chest pain or dyspnea. There is no history of issue with bowel or bladder dysfunction. There is no history of dizziness or lightheadedness. There is no history of issue with sleep or mood. There is no history of issue with present medication.     Pain  Associated symptoms include fatigue and numbness. Pertinent negatives include no abdominal pain, chest pain, congestion, fever, nausea, neck pain, rash, sore throat, vomiting or weakness.          /72 (BP Location: Left arm, Patient Position: Sitting, Cuff Size: Large Adult)   Pulse 68   Temp 96.4 °F (35.8 °C) (Temporal)   Wt 75.8 kg (167 lb)   SpO2 96%   BMI 34.90 kg/m²       Chief Complaint   Patient presents with   • referral hand specialist     And needs a new order for EMG - already sees Dr. Shaw sees her for the left knee but we need new order for him to see the right knee   • Med Refill     Wants back on phentramin    • Pain     Wants a repeat on the MRI on her back - pain all over and wants testing for arthritis and testing for another disease and wants to have  those 2 tests done - she has that written            Current Outpatient Medications:   •  busPIRone (BUSPAR) 10 MG tablet, Take 10 mg by mouth 2 (Two) Times a Day., Disp: , Rfl:   •  cyanocobalamin (VITAMIN B-12) 2000 MCG tablet, Take 2,000 mcg by mouth Daily., Disp: , Rfl:   •  DULoxetine (CYMBALTA) 60 MG capsule, Take 60 mg by mouth Daily., Disp: , Rfl:   •  famotidine (PEPCID) 40 MG tablet, Take 1 tablet by mouth every night at bedtime., Disp: , Rfl:   •  multivitamin with minerals tablet tablet, Take 1 tablet by mouth Daily., Disp: , Rfl:   •  phentermine (ADIPEX-P) 37.5 MG tablet, Take 1 tablet by mouth Every Morning Before Breakfast., Disp: 30 tablet, Rfl: 0  •  senna (SENOKOT) 8.6 MG tablet, Take 1 tablet by mouth 2 (Two) Times a Day. (takes 2 at night  Together), Disp: , Rfl:   •  spironolactone (ALDACTONE) 50 MG tablet, Take 100 mg by mouth 2 (two) times a day., Disp: , Rfl:   •  valACYclovir (VALTREX) 500 MG tablet, Take 500 mg by mouth Daily., Disp: , Rfl:   •  Vibegron (Gemtesa) 75 MG tablet, Take  by mouth., Disp: , Rfl:   •  Zinc 25 MG tablet, Take 1 tablet by mouth Daily., Disp: , Rfl:   •  albuterol sulfate  (90 Base) MCG/ACT inhaler, 2 puffs Every 6 (Six) Hours As Needed., Disp: , Rfl:   •  Ascorbic Acid (Vitamin C) 500 MG capsule, Take 1 capsule by mouth Daily., Disp: , Rfl:   •  azithromycin (Zithromax Z-James) 250 MG tablet, Take 2 tablets the first day, then 1 tablet daily for 4 days., Disp: 6 tablet, Rfl: 0  •  budesonide-formoterol (SYMBICORT) 80-4.5 MCG/ACT inhaler, Inhale 2 puffs 2 (Two) Times a Day., Disp: 1 each, Rfl: 5  •  fluticasone (FLONASE) 50 MCG/ACT nasal spray, 2 sprays into the nostril(s) as directed by provider Daily., Disp: 11.1 mL, Rfl: 0  •  loratadine-pseudoephedrine (Claritin-D 12 Hour) 5-120 MG per 12 hr tablet, Take 1 tablet by mouth 2 (Two) Times a Day for 5 days., Disp: 10 tablet, Rfl: 0  •  pantoprazole (PROTONIX) 40 MG EC tablet, Take 40 mg by mouth Daily., Disp:  , Rfl:   •  polyethylene glycol (MIRALAX) 17 g packet, Take 17 g by mouth Daily. AM, Disp: , Rfl:   •  vitamin D (ERGOCALCIFEROL) 1.25 MG (76695 UT) capsule capsule, Take 50,000 Units by mouth 1 (One) Time Per Week., Disp: , Rfl:         The following portions of the patient's history were reviewed and updated as appropriate: allergies, current medications, past family history, past medical history, past social history, past surgical history, and problem list.    Review of Systems   Constitutional: Positive for fatigue. Negative for activity change and fever.   HENT: Negative for congestion, sinus pressure, sinus pain, sore throat and trouble swallowing.    Eyes: Negative for visual disturbance.   Respiratory: Negative for chest tightness, shortness of breath and wheezing.    Cardiovascular: Negative for chest pain.   Gastrointestinal: Positive for constipation. Negative for abdominal distention, abdominal pain, diarrhea, nausea and vomiting.   Genitourinary: Positive for frequency and urgency. Negative for difficulty urinating and dysuria.   Musculoskeletal: Positive for back pain. Negative for neck pain.   Skin: Negative for rash.   Neurological: Positive for numbness. Negative for dizziness and weakness.   Psychiatric/Behavioral: Negative for agitation, decreased concentration, hallucinations and suicidal ideas.       Objective   Physical Exam  Vitals and nursing note reviewed.   HENT:      Right Ear: Tympanic membrane and ear canal normal.      Left Ear: Tympanic membrane and ear canal normal.   Cardiovascular:      Rate and Rhythm: Normal rate and regular rhythm.      Heart sounds: Normal heart sounds. No murmur heard.  Pulmonary:      Effort: Pulmonary effort is normal.      Breath sounds: No wheezing or rales.   Abdominal:      General: Bowel sounds are normal.      Palpations: Abdomen is soft.      Tenderness: There is no abdominal tenderness. There is no guarding.   Musculoskeletal:      Cervical back: Neck  supple. No rigidity.      Right lower leg: No edema.      Left lower leg: No edema.   Lymphadenopathy:      Cervical: No cervical adenopathy.   Neurological:      Mental Status: She is alert and oriented to person, place, and time. Mental status is at baseline.   Psychiatric:         Mood and Affect: Mood normal.           Assessment & Plan   Problems Addressed this Visit        Musculoskeletal and Injuries    Injury of right knee    Arthritis    Relevant Orders    Rheumatoid Factor, Quant    Sedimentation rate, automated    MAGGIE Comprehensive Panel   Other Visit Diagnoses     Bilateral hand numbness    -  Primary    Relevant Orders    Ambulatory Referral to Hand Surgery    EMG 2 Limbs    Chronic midline low back pain with left-sided sciatica        Chronic neck pain        Dyspnea on exertion        Relevant Orders    Comprehensive metabolic panel    CBC w AUTO Differential      Diagnoses       Codes Comments    Bilateral hand numbness    -  Primary ICD-10-CM: R20.0  ICD-9-CM: 782.0     Chronic midline low back pain with left-sided sciatica     ICD-10-CM: M54.42, G89.29  ICD-9-CM: 724.2, 724.3, 338.29     Chronic neck pain     ICD-10-CM: M54.2, G89.29  ICD-9-CM: 723.1, 338.29     Arthritis     ICD-10-CM: M19.90  ICD-9-CM: 716.90     Dyspnea on exertion     ICD-10-CM: R06.09  ICD-9-CM: 786.09     Chronic pain of right knee     ICD-10-CM: M25.561, G89.29  ICD-9-CM: 719.46, 338.29

## 2023-02-16 ENCOUNTER — PATIENT MESSAGE (OUTPATIENT)
Dept: FAMILY MEDICINE CLINIC | Facility: CLINIC | Age: 38
End: 2023-02-16

## 2023-02-16 LAB
CENTROMERE B AB SER-ACNC: <0.2 AI (ref 0–0.9)
CHROMATIN AB SERPL-ACNC: <0.2 AI (ref 0–0.9)
DSDNA AB SER-ACNC: <1 IU/ML (ref 0–9)
ENA JO1 AB SER-ACNC: <0.2 AI (ref 0–0.9)
ENA RNP AB SER-ACNC: 0.2 AI (ref 0–0.9)
ENA SCL70 AB SER-ACNC: <0.2 AI (ref 0–0.9)
ENA SM AB SER-ACNC: <0.2 AI (ref 0–0.9)
ENA SS-A AB SER-ACNC: <0.2 AI (ref 0–0.9)
ENA SS-B AB SER-ACNC: <0.2 AI (ref 0–0.9)
Lab: NORMAL

## 2023-02-20 DIAGNOSIS — D71 CHRONIC GRANULOMATOUS DISEASE: Primary | ICD-10-CM

## 2023-02-22 ENCOUNTER — OFFICE VISIT (OUTPATIENT)
Dept: PAIN MEDICINE | Facility: CLINIC | Age: 38
End: 2023-02-22
Payer: MEDICAID

## 2023-02-22 VITALS
RESPIRATION RATE: 16 BRPM | HEART RATE: 71 BPM | SYSTOLIC BLOOD PRESSURE: 95 MMHG | OXYGEN SATURATION: 98 % | DIASTOLIC BLOOD PRESSURE: 60 MMHG

## 2023-02-22 DIAGNOSIS — M54.16 LUMBAR RADICULITIS: ICD-10-CM

## 2023-02-22 DIAGNOSIS — M79.18 MYOFASCIAL PAIN SYNDROME: ICD-10-CM

## 2023-02-22 DIAGNOSIS — M54.12 CERVICAL RADICULITIS: ICD-10-CM

## 2023-02-22 DIAGNOSIS — G89.4 CHRONIC PAIN SYNDROME: Primary | ICD-10-CM

## 2023-02-22 DIAGNOSIS — M47.817 LUMBOSACRAL SPONDYLOSIS WITHOUT MYELOPATHY: ICD-10-CM

## 2023-02-22 DIAGNOSIS — M47.812 CERVICAL SPONDYLOSIS WITHOUT MYELOPATHY: ICD-10-CM

## 2023-02-22 PROCEDURE — 99214 OFFICE O/P EST MOD 30 MIN: CPT | Performed by: ANESTHESIOLOGY

## 2023-02-23 NOTE — PROGRESS NOTES
Subjective    CC neck, upper and lower back and multiple joint pain  Rand Schilling is a 37 y.o. female with history of chronic granulomatous disease, chronic pain/polyarthralgia, chronic neck and back pain here for follow-up.   Last seen a year ago was scheduled for cervical KLAUS and lumbar KLAUS at the time but could not have it done due to financial issues.  Returned with continued and worsening back pain and left lower extremity radicular pain.  Also continued neck pain with bilateral upper extremity neuropathic pain.  Both interfering with ADL and sleep.  Chronic back pain radiating to bilateral hips and left lower extremity with burning tingling numbness in the leg and sharp pain worse with activity however constant.  Denies saddle anesthesia bladder bowel continence.  Chronic neck pain radiating to bilateral shoulders and upper thoracic paraspinal areas.  Denies upper extremity radicular pain.  States she had dealt with pain since she was 16 years old uncertain about her chronic pain symptoms thinking it is likely related to chronic granulomatous disease, she is filing for disability for these reasons.  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  Pain impairing her daily activity and sleep.  Have tried home exercise program, physical therapy in the past but cannot participate due to pain.    C-spine MRI 2022: Mild C4 C6 spondylosis. No herniated disc canal or neuroforaminal stenosis.   C-spine x-ray 2021 early degenerative changes C4-C5 and C5-C6.  L-spine MRI 2021 mild multilevel degenerative changes of lumbar spine no canal stenosis. Mild bilateral foraminal narrowing at L4-L5, mild left foraminal narrowing L3-L4 and L5-S1.  Right knee MRI 2021 mild patellar chondromalacia otherwise no evidence of internal derangement or significant degenerative change.    Pain Assessment   Location of Pain: All over  Description of Pain: Dull/Aching, Throbbing, Stabbing  Previous Pain Rating :7  Current Pain Rating:  6  Aggravating Factors: Activity  Alleviating Factors: Rest, Medication    PEG Assessment   What number best describes your pain on average in the past week?7  What number best describes how, during the past week, pain has interfered with your enjoyment of life?3  What number best describes how, during the past week, pain has interfered with your general activity?  10    The following portions of the patient's history were reviewed and updated as appropriate: allergies, current medications, past family history, past medical history, past social history, past surgical history and problem list.     has a past medical history of Anxiety, Arthritis, Cancer (HCC), COPD (chronic obstructive pulmonary disease) (HCC), Depression, Enlarged liver, Genital herpes, GERD (gastroesophageal reflux disease), Granulomatosis, High cholesterol, Joint pain, Knee pain, Leg pain, Low back pain, Lung nodules, MRSA infection, Neck pain, and Numbness and tingling of both feet.   has a past surgical history that includes Hysterectomy; Breast surgery; Esophagogastroduodenoscopy; Colonoscopy (N/A, 1/21/2022); Other surgical history; Finger fracture surgery; and Tubal ligation.  Family history is unknown by patient.  Social History     Tobacco Use   • Smoking status: Every Day     Packs/day: 1.00     Types: Cigarettes   • Smokeless tobacco: Never   Substance Use Topics   • Alcohol use: Not Currently       Review of Systems   Musculoskeletal: Positive for arthralgias, back pain, myalgias and neck pain.   All other systems reviewed and are negative.      Objective   Physical Exam  Vitals reviewed.   Constitutional:       General: She is not in acute distress.     Appearance: She is obese.   Pulmonary:      Effort: Pulmonary effort is normal.   Musculoskeletal:      Cervical back: Tenderness present. Decreased range of motion.      Lumbar back: Tenderness present. Decreased range of motion. Positive left straight leg raise test.      Comments:  Lumbar loading positive, pain on extension of low back past 5 degrees.  TTP on the lumbar facets noted.         BP 95/60   Pulse 71   Resp 16   SpO2 98%     PHQ 9 on chart  Opioid risk tool low risk    Assessment & Plan   Diagnoses and all orders for this visit:    1. Chronic pain syndrome (Primary)    2. Lumbosacral spondylosis without myelopathy  -     Ambulatory Referral to Physical Therapy Evaluate and treat; Stretching, Strengthening    3. Lumbar radiculitis  -     Epidural Block  -     Ambulatory Referral to Physical Therapy Evaluate and treat; Stretching, Strengthening    4. Cervical spondylosis without myelopathy  -     Ambulatory Referral to Physical Therapy Evaluate and treat; Stretching, Strengthening    5. Cervical radiculitis  -     Ambulatory Referral to Physical Therapy Evaluate and treat; Stretching, Strengthening    6. Myofascial pain syndrome  -     Ambulatory Referral to Physical Therapy Evaluate and treat; Stretching, Strengthening      Summary  Rand Schilling is a 37 y.o. female with history of chronic granulomatous disease, chronic pain/polyarthralgia, chronic neck and back pain here for follow-up.  Referred by PCP.  Chronic pain from lumbar and cervical DDD spondylosis, with radicular pain.  Chronic polyarthralgia generalized myofascial pain.    Last seen a year ago was scheduled for cervical KLAUS and lumbar KLAUS at the time but could not have it done due to financial issues.  Returned with continued and worsening back pain and left lower extremity radicular pain.  Also continued neck pain with bilateral upper extremity neuropathic pain.  Both interfering with ADL and sleep.  No relief with home exercise program.    We will schedule for LESI (left parasagittal).  Risks and benefits discussed.  New referral to physical therapy for core strengthening and myofascial release    Sitter cervical KLAUS as needed.    RTC for procedure or in 2 months

## 2023-02-27 ENCOUNTER — PATIENT MESSAGE (OUTPATIENT)
Dept: FAMILY MEDICINE CLINIC | Facility: CLINIC | Age: 38
End: 2023-02-27

## 2023-03-02 ENCOUNTER — OFFICE VISIT (OUTPATIENT)
Dept: PULMONOLOGY | Facility: HOSPITAL | Age: 38
End: 2023-03-02
Payer: MEDICAID

## 2023-03-02 VITALS
OXYGEN SATURATION: 98 % | HEIGHT: 58 IN | BODY MASS INDEX: 38.83 KG/M2 | DIASTOLIC BLOOD PRESSURE: 69 MMHG | HEART RATE: 66 BPM | WEIGHT: 185 LBS | SYSTOLIC BLOOD PRESSURE: 104 MMHG | RESPIRATION RATE: 14 BRPM

## 2023-03-02 DIAGNOSIS — J44.9 CHRONIC OBSTRUCTIVE PULMONARY DISEASE, UNSPECIFIED COPD TYPE: Primary | ICD-10-CM

## 2023-03-02 DIAGNOSIS — F17.200 TOBACCO DEPENDENCY: ICD-10-CM

## 2023-03-02 DIAGNOSIS — R06.83 SNORING: ICD-10-CM

## 2023-03-02 DIAGNOSIS — R06.02 SHORTNESS OF BREATH: ICD-10-CM

## 2023-03-02 PROCEDURE — G0463 HOSPITAL OUTPT CLINIC VISIT: HCPCS

## 2023-03-02 RX ORDER — ALBUTEROL SULFATE 90 UG/1
2 AEROSOL, METERED RESPIRATORY (INHALATION) EVERY 6 HOURS PRN
Qty: 18 G | Refills: 5 | Status: SHIPPED | OUTPATIENT
Start: 2023-03-02 | End: 2023-03-02 | Stop reason: SDUPTHER

## 2023-03-02 RX ORDER — ALBUTEROL SULFATE 90 UG/1
2 AEROSOL, METERED RESPIRATORY (INHALATION) EVERY 6 HOURS PRN
Qty: 18 G | Refills: 5 | Status: SHIPPED | OUTPATIENT
Start: 2023-03-02

## 2023-03-02 RX ORDER — BUDESONIDE AND FORMOTEROL FUMARATE DIHYDRATE 80; 4.5 UG/1; UG/1
2 AEROSOL RESPIRATORY (INHALATION) 2 TIMES DAILY
Qty: 1 EACH | Refills: 5 | Status: SHIPPED | OUTPATIENT
Start: 2023-03-02 | End: 2023-03-02 | Stop reason: SDUPTHER

## 2023-03-02 RX ORDER — BUDESONIDE AND FORMOTEROL FUMARATE DIHYDRATE 80; 4.5 UG/1; UG/1
2 AEROSOL RESPIRATORY (INHALATION) 2 TIMES DAILY
Qty: 1 EACH | Refills: 5 | Status: SHIPPED | OUTPATIENT
Start: 2023-03-02
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
## 2023-03-02 NOTE — TELEPHONE ENCOUNTER
I called Dr. Seipel's office and they could get her in sooner. They said probably beginning of May but Dr. Seipel does only do 2 extremities at a time so she would not be able to do all at the same time. If you would like me to switch the referral to Dr. Seipel just let me know and I can get it switched and call scheduling to cancel the EMG at [facility]. Thanks, Viki

## 2023-03-03 NOTE — TELEPHONE ENCOUNTER
I got the referral switched to Dr. Seipels office and they will call pt to schedule. I also called [facility] and cancelled July EMG and called and left a vm letting the pt know the referral had been changed and the [facility] EMG had been cancelled.

## 2023-03-07 ENCOUNTER — TELEPHONE (OUTPATIENT)
Dept: PAIN MEDICINE | Facility: CLINIC | Age: 38
End: 2023-03-07
Payer: MEDICAID

## 2023-03-07 NOTE — TELEPHONE ENCOUNTER
Caller: RON    Relationship: SELF    Best call back number: [phone]    What is the best time to reach you: ANY TIME - OK TO LEAVE VM IF NO ANSWER.       What was the call regarding: PATIENT IS CALLING TO MAKE SURE THAT HER INSURANCE WITH [facility] MEDICAID COVERS HER EPIDURAL BLOCK - HER APPT IS TOMORROW @ 2PM.     Do you require a callback: YES

## 2023-03-08 ENCOUNTER — HOSPITAL ENCOUNTER (OUTPATIENT)
Dept: PAIN MEDICINE | Facility: HOSPITAL | Age: 38
Discharge: HOME | End: 2023-03-08
Payer: MEDICAID

## 2023-03-08 VITALS
TEMPERATURE: 97.6 F | OXYGEN SATURATION: 98 % | HEART RATE: 65 BPM | SYSTOLIC BLOOD PRESSURE: 105 MMHG | RESPIRATION RATE: 16 BRPM | HEIGHT: 58 IN | BODY MASS INDEX: 38.83 KG/M2 | WEIGHT: 185 LBS | DIASTOLIC BLOOD PRESSURE: 69 MMHG

## 2023-03-08 DIAGNOSIS — M54.16 LUMBAR RADICULITIS: ICD-10-CM

## 2023-03-08 DIAGNOSIS — R52 PAIN: ICD-10-CM

## 2023-03-08 PROCEDURE — 62323 NJX INTERLAMINAR LMBR/SAC: CPT | Performed by: ANESTHESIOLOGY

## 2023-03-08 PROCEDURE — 25010000002 METHYLPREDNISOLONE PER 40 MG: Performed by: ANESTHESIOLOGY

## 2023-03-08 PROCEDURE — 77003 FLUOROGUIDE FOR SPINE INJECT: CPT

## 2023-03-08 PROCEDURE — 25510000001 IOPAMIDOL 41 % SOLUTION: Performed by: ANESTHESIOLOGY

## 2023-03-08 RX ORDER — METHYLPREDNISOLONE ACETATE 40 MG/ML
40 INJECTION, SUSPENSION INTRA-ARTICULAR; INTRALESIONAL; INTRAMUSCULAR; SOFT TISSUE ONCE
Status: COMPLETED | OUTPATIENT
Start: 2023-03-08 | End: 2023-03-08

## 2023-03-08 RX ADMIN — IOPAMIDOL 3 ML: 408 INJECTION, SOLUTION INTRATHECAL at 14:02

## 2023-03-08 RX ADMIN — METHYLPREDNISOLONE ACETATE 40 MG: 40 INJECTION, SUSPENSION INTRA-ARTICULAR; INTRALESIONAL; INTRAMUSCULAR; INTRASYNOVIAL; SOFT TISSUE at 14:02

## 2023-03-08 NOTE — PROCEDURES
"Subjective   CC back pain, left leg pain  Rand Schilling is a 37 y.o. female with lumbar radiculitis here for LESI..     No anticoagulation    Pain Assessment   Location of Pain: Lower Back, R Hip, L Hip, L Leg,  Description of Pain: Dull/Aching, Throbbing, Stabbing  Previous Pain Rating :  Current Pain Rating:   Aggravating Factors: Activity  Alleviating Factors: Rest, Medication  Pain onset over 12 weeks  Pain interferes with ADL's    The following portions of the patient's history were reviewed and updated as appropriate: allergies, current medications, past family history, past medical history, past social history, past surgical history and problem list.      Review of Systems  As in HPI  Objective   Physical Exam  Constitutional:       General: She is not in acute distress.     Appearance: She is well-developed.   Cardiovascular:      Rate and Rhythm: Normal rate.   Pulmonary:      Effort: Pulmonary effort is normal.   Musculoskeletal:      Lumbar back: Tenderness present. Decreased range of motion.   Neurological:      Mental Status: She is alert and oriented to person, place, and time.       /69 (BP Location: Left arm, Patient Position: Sitting)   Pulse 65   Temp 97.6 °F (36.4 °C)   Resp 16   Ht 147.3 cm (58\")   Wt 83.9 kg (185 lb)   SpO2 98%   BMI 38.67 kg/m²     Assessment & Plan    underwent LESI    RTC 4-6 weeks or as needed for repeat    DATE OF PROCEDURE: 3/8/2023    PREOPERATIVE DIAGNOSIS: lumbar DDD/radiculitis    POSTOPERATIVE DIAGNOSIS: same    PROCEDURE PERFORMED:  lumbar Epidural Steroid Injection    The patient presents with a history of   lumbar degenerative disc disease with  radiculitis. The patient presents today for a  lumbar epidural steroid injection at level  L5/S1.   The patient was seen in the preoperative area.  Patient's consent was obtained and updated.  Vitals were taken.  Patient was then brought to the procedure suite and placed in a prone position. The appropriate " anatomic area was widely prepped with Chloraprep and draped in a sterile fashion. Noninvasive monitoring per routine anesthesia protocol was placed.  Under fluoroscopic guidance using  AP view a 20 gauge styleted tuohy needle was passed through skin anesthetized with 1% Lidocaine without epinephrine.  The needle was advanced using the continuous loss of resistance to saline technique into the L5 epidural space. Needle tip placement in the epidural space was confirmed by loss of resistance and injection of 1 mL of  preservative free contrast.  Following this 8 mL of a solution containing  1 mL of 40 mg Depo-Medrol and 7 mL of preservative-free saline was carefully administered in the epidural space.  A sterile dressing was placed over the puncture site.    The patient tolerated the procedure with no complications . They were then brought to the post procedure area where they recovered nicely.

## 2023-03-08 NOTE — DISCHARGE INSTRUCTIONS

## 2023-03-09 ENCOUNTER — TELEPHONE (OUTPATIENT)
Dept: PAIN MEDICINE | Facility: HOSPITAL | Age: 38
End: 2023-03-09
Payer: MEDICAID

## 2023-03-09 ENCOUNTER — OFFICE (AMBULATORY)
Dept: URBAN - METROPOLITAN AREA CLINIC 64 | Facility: CLINIC | Age: 38
End: 2023-03-09
Payer: COMMERCIAL

## 2023-03-09 VITALS
SYSTOLIC BLOOD PRESSURE: 115 MMHG | HEART RATE: 85 BPM | HEIGHT: 60 IN | WEIGHT: 184 LBS | DIASTOLIC BLOOD PRESSURE: 75 MMHG

## 2023-03-09 DIAGNOSIS — K62.5 HEMORRHAGE OF ANUS AND RECTUM: ICD-10-CM

## 2023-03-09 DIAGNOSIS — K21.00 GASTRO-ESOPHAGEAL REFLUX DISEASE WITH ESOPHAGITIS, WITHOUT B: ICD-10-CM

## 2023-03-09 DIAGNOSIS — K64.8 OTHER HEMORRHOIDS: ICD-10-CM

## 2023-03-09 PROCEDURE — 99214 OFFICE O/P EST MOD 30 MIN: CPT | Performed by: INTERNAL MEDICINE

## 2023-03-09 RX ORDER — FAMOTIDINE 40 MG/1
40 TABLET, FILM COATED ORAL
Qty: 90 | Refills: 3 | Status: ACTIVE
Start: 2021-07-23

## 2023-03-09 RX ORDER — PANTOPRAZOLE 40 MG/1
40 TABLET, DELAYED RELEASE ORAL
Qty: 90 | Refills: 3 | Status: ACTIVE
Start: 2021-07-23

## 2023-03-15 ENCOUNTER — HOSPITAL ENCOUNTER (OUTPATIENT)
Dept: MRI IMAGING | Facility: HOSPITAL | Age: 38
Discharge: HOME | End: 2023-03-15
Payer: MEDICAID

## 2023-03-15 DIAGNOSIS — M54.42 CHRONIC MIDLINE LOW BACK PAIN WITH LEFT-SIDED SCIATICA: ICD-10-CM

## 2023-03-15 DIAGNOSIS — G89.29 CHRONIC NECK PAIN: ICD-10-CM

## 2023-03-15 DIAGNOSIS — G89.29 CHRONIC MIDLINE LOW BACK PAIN WITH LEFT-SIDED SCIATICA: ICD-10-CM

## 2023-03-15 DIAGNOSIS — M54.2 CHRONIC NECK PAIN: ICD-10-CM

## 2023-03-15 PROCEDURE — 72141 MRI NECK SPINE W/O DYE: CPT

## 2023-03-15 PROCEDURE — 72148 MRI LUMBAR SPINE W/O DYE: CPT

## 2023-03-17 ENCOUNTER — TELEPHONE (OUTPATIENT)
Dept: PAIN MEDICINE | Facility: CLINIC | Age: 38
End: 2023-03-17
Payer: MEDICAID

## 2023-03-19 ENCOUNTER — PATIENT MESSAGE (OUTPATIENT)
Dept: FAMILY MEDICINE CLINIC | Facility: CLINIC | Age: 38
End: 2023-03-19

## 2023-04-10 DIAGNOSIS — R06.83 SNORING: Primary | ICD-10-CM

## 2023-04-11 ENCOUNTER — OFFICE (AMBULATORY)
Dept: URBAN - METROPOLITAN AREA CLINIC 64 | Facility: CLINIC | Age: 38
End: 2023-04-11
Payer: COMMERCIAL

## 2023-04-11 VITALS — HEIGHT: 60 IN

## 2023-04-11 DIAGNOSIS — K64.0 FIRST DEGREE HEMORRHOIDS: ICD-10-CM

## 2023-04-11 PROCEDURE — 46221 LIGATION OF HEMORRHOID(S): CPT | Performed by: INTERNAL MEDICINE

## 2023-04-12 ENCOUNTER — OFFICE VISIT (OUTPATIENT)
Dept: ORTHOPEDIC SURGERY | Facility: CLINIC | Age: 38
End: 2023-04-12
Payer: MEDICAID

## 2023-04-12 VITALS — BODY MASS INDEX: 39.38 KG/M2 | HEIGHT: 58 IN | WEIGHT: 187.6 LBS

## 2023-04-12 DIAGNOSIS — M19.049 HAND ARTHRITIS: ICD-10-CM

## 2023-04-12 DIAGNOSIS — M25.561 PAIN IN BOTH KNEES, UNSPECIFIED CHRONICITY: Primary | ICD-10-CM

## 2023-04-12 DIAGNOSIS — M25.562 PAIN IN BOTH KNEES, UNSPECIFIED CHRONICITY: Primary | ICD-10-CM

## 2023-04-12 NOTE — PROGRESS NOTES
"Chief Complaint  Follow-up of the Left Knee and Follow-up of the Right Knee    Subjective    History of Present Illness      Rand Schilling is a 37 y.o. female who presents to [facility] for follow-up on bilateral knee and bilateral hand pain.  History of Present Illness this is a patient whose mobility is progressively suffering because of pain and discomfort in both the knees.  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  Her symptoms are slowly and progressively getting worse.  She has pain over the patellofemoral joint.  She has difficulty going up and down the steps.  There is lumbar spine pathology with radiation of pain to the hip buttock and thigh.  LESI helped the patient to about 20 to 25% and improving her symptoms.  She is trying to get disability from the state because of mobility issues both with her knees and with her hands and fingers.  She states that her knee hurts on rainy days.  She had an MRI of the knee on the right side which showed essentially chondromalacia patella.  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  She is attached to the pain clinic and receives her narcotics from the pain clinic managers.  She has gotten into a physical therapy program and has joined a gym where she can get into the pool which does seem to help her.  Physical therapy did not help her at all in terms of symptom management.  Pain Location:  BILATERAL knee  Radiation: none  Quality: aching, stabbing, piercing, burning, popping, throbbing  Intensity/Severity: moderate to severe  Duration: since 07/20/2021  Progression of symptoms: yes, progressive worsening  Onset quality: gradual   Timing: intermittent  Aggravating Factors: any weight bearing, kneeling, rising after sitting, walking  Alleviating Factors: NSAIDs, steroid injection (intra-articular), rest, brace, stretching/PT/OT, " "icy/hot  Previous Episodes: yes  Associated Symptoms: pain, clicking/popping, decreased ROM, decreased strength  ADLs Affected: grooming/hygiene/toileting/personal care, dressing, ambulating, recreational activities/sports, meal preparation  Previous Treatment: NSAIDs, brace and physical/occupational therapy       Objective   Vital Signs:   Ht 147.3 cm (58\")   Wt 85.1 kg (187 lb 9.6 oz)   BMI 39.21 kg/m²     Physical Exam  Physical Exam  Vitals signs and nursing note reviewed.   Constitutional:       Appearance: Normal appearance.   Pulmonary:      Effort: Pulmonary effort is normal.   Skin:     General: Skin is warm and dry.      Capillary Refill: Capillary refill takes less than 2 seconds.   Neurological:      General: No focal deficit present.      Mental Status: He is alert and oriented to person, place, and time. Mental status is at baseline.   Psychiatric:         Mood and Affect: Mood normal.         Behavior: Behavior normal.         Thought Content: Thought content normal.         Judgment: Judgment normal.     Ortho Exam   Bilateral knee (meniscus). The knee joint shows effusion with some thickening of the synovial membrane. There is tenderness over the meniscus. Apley's grinding test is positive over the joint line. Tae's sign is positive with increased pain on torsional testing. There is a distinct click in mid flexion. Range of motion is from 0-110 degrees of flexion. No instability on medial or lateral testing. Anterior and posterior drawer testing is negative. Lachman test is negative. Joint line tenderness is present to direct palpation. There is some tenderness over the medial face of the tibia just distal to the joint line. The dorsalis pedis and posterior tibial artery pulses are palpable. Common peroneal nerve function is well preserved. Gait is cautious and somewhat antalgic. Full extension causes the patient to have quite a bit of pain and discomfort.         Result Review :   The " following data was reviewed by: Chriss Shaw MD on 04/12/2023:  Radiologic studies - see below for interpretation  BILATERAL knee xrays  weightbearing/standing ap/lateral views were performed at [facility] on 04/12/2023. Images were independently viewed and interpreted by myself, my impression as follows:      bilateral Knee X-Ray  Indication: Evaluation of pain and discomfort in the suprapatellar region of both knees.  AP, Lateral views  Findings: Early degenerative change with mild suprapatellar fluid effusion.  no bony lesion  Soft tissues within normal limits  within normal limits joint spaces  Hardware appropriately positioned not applicable      no prior studies available for comparison.    This patient's x-ray report was graded according to the Kellgren and Kristofer classification.  This took into account the joint space narrowing, osteophyte formation, sclerosis of the distal femur/proximal tibia along with deformity of those bones.  The findings were indicative of K L grade 2.    X-RAY was ordered and reviewed by Chriss Shaw MD      Procedures           Assessment   Assessment and Plan    Diagnoses and all orders for this visit:    1. Pain in both knees, unspecified chronicity (Primary)  -     XR Knee 3 View Bilateral    2. Hand arthritis          Follow Up   · Compression/brace to prevent the knee from buckling and giving out.  · Calcium and vitamin D for bone health.  · Glucosamine, chondroitin and turmeric for cartilage health.  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  · She is looking for the disability process and I do think that is possibly a reasonable option for her.  · Rest, ice, compression, and elevation (RICE) therapy  · Stretching and strengthening exercises  · OTC Alternate Ibuprofen and Tylenol as needed  · Follow up in 6 month(s)  • Patient was given instructions and counseling regarding her  condition or for health maintenance advice. Please see specific information pulled into the AVS if appropriate.     Chriss Shaw MD   Date of Encounter: 4/12/2023   Electronically signed by Chriss Shaw MD, 04/12/23, 12:46 PM EDT.     EMR Dragon/Transcription disclaimer:  Much of this encounter note is an electronic transcription/translation of spoken language to printed text. The electronic translation of spoken language may permit erroneous, or at times, nonsensical words or phrases to be inadvertently transcribed; Although I have reviewed the note for such errors, some may still exist.

## 2023-04-17 NOTE — PROGRESS NOTES
EMG and Nerve Conduction Studies    Patient Name: Rand Schilling    Date of Study: 4/19/23    Referring Provider:DR. CRAFT    History: This patient is a 37-year-old female complains of pain and numbness in both hands.    BUE-NUMBNESS    Results:    The complete report includes the data sheets.     Prior to starting the procedure, the patient's identity was verified, pertinent available records were reviewed, the nature of the procedure was explained, the appropriate site of the exam were confirmed directly with the patient, and a pre-procedure pause was performed for final verification of all the above.    1.  The right median sensory distal latency is normal.  The right median motor distal latency is mildly prolonged and prolonged compared to the ulnar latency which was within normal limits.  The right median motor nerve conduction study was normal.    2.  The left median sensory distal latency is within normal limits.  The left median palmar distal latency is prolonged and prolonged compared to the ulnar palmar latency which was well within normal limits.  The left median motor nerve conduction study was normal.    3.  The ulnar palmar distal latencies and ulnar motor nerve conduction studies were normal bilaterally.    4.  EMG of the muscles examined in the bilateral C5-T1 myotomes were normal        Impression:    This is an abnormal study.  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  There is no evidence of focal ulnar neuropathy at the elbow or neurogenic change in the muscles examined in the C5-T1 myotomes.    Electronically signed by :    Joseph Seipel, M.D.  April 19, 2023

## 2023-04-19 ENCOUNTER — OFFICE VISIT (OUTPATIENT)
Dept: PAIN MEDICINE | Facility: CLINIC | Age: 38
End: 2023-04-19
Payer: MEDICAID

## 2023-04-19 ENCOUNTER — PROCEDURE VISIT (OUTPATIENT)
Dept: NEUROLOGY | Facility: CLINIC | Age: 38
End: 2023-04-19
Payer: MEDICAID

## 2023-04-19 VITALS
SYSTOLIC BLOOD PRESSURE: 109 MMHG | RESPIRATION RATE: 16 BRPM | HEART RATE: 86 BPM | DIASTOLIC BLOOD PRESSURE: 75 MMHG | OXYGEN SATURATION: 99 %

## 2023-04-19 VITALS
HEART RATE: 59 BPM | DIASTOLIC BLOOD PRESSURE: 77 MMHG | HEIGHT: 58 IN | BODY MASS INDEX: 39.25 KG/M2 | WEIGHT: 187 LBS | SYSTOLIC BLOOD PRESSURE: 117 MMHG

## 2023-04-19 DIAGNOSIS — M47.812 CERVICAL SPONDYLOSIS WITHOUT MYELOPATHY: ICD-10-CM

## 2023-04-19 DIAGNOSIS — M79.18 MYOFASCIAL PAIN SYNDROME: ICD-10-CM

## 2023-04-19 DIAGNOSIS — G89.4 CHRONIC PAIN SYNDROME: Primary | ICD-10-CM

## 2023-04-19 DIAGNOSIS — M79.7 FIBROMYALGIA: ICD-10-CM

## 2023-04-19 DIAGNOSIS — M47.817 LUMBOSACRAL SPONDYLOSIS WITHOUT MYELOPATHY: ICD-10-CM

## 2023-04-19 DIAGNOSIS — R20.0 BILATERAL HAND NUMBNESS: ICD-10-CM

## 2023-04-19 DIAGNOSIS — M25.50 POLYARTHRALGIA: ICD-10-CM

## 2023-04-19 PROBLEM — K63.5 COLON POLYPS: Status: ACTIVE | Noted: 2023-03-16

## 2023-04-19 PROBLEM — N30.10 INTERSTITIAL CYSTITIS: Status: ACTIVE | Noted: 2023-03-16

## 2023-04-19 RX ORDER — NABUMETONE 500 MG/1
500 TABLET, FILM COATED ORAL 2 TIMES DAILY PRN
Qty: 60 TABLET | Refills: 5 | Status: SHIPPED | OUTPATIENT
Start: 2023-04-19

## 2023-04-19 RX ORDER — AMOXICILLIN 250 MG
2 CAPSULE ORAL NIGHTLY
COMMUNITY

## 2023-04-19 RX ORDER — PHENTERMINE HYDROCHLORIDE 37.5 MG/1
37.5 TABLET ORAL
COMMUNITY
Start: 2023-03-16

## 2023-04-19 RX ORDER — PYRIDOXINE HCL (VITAMIN B6) 100 MG
TABLET ORAL
COMMUNITY

## 2023-04-19 RX ORDER — MILK THISTLE 150 MG
CAPSULE ORAL
COMMUNITY

## 2023-04-19 RX ORDER — PREGABALIN 75 MG/1
75 CAPSULE ORAL 2 TIMES DAILY
Qty: 60 CAPSULE | Refills: 0 | Status: SHIPPED | OUTPATIENT
Start: 2023-04-19

## 2023-04-19 NOTE — PROGRESS NOTES
Subjective    CC neck, upper and lower back and multiple joint pain  Rand Schilling is a 37 y.o. female with history of chronic granulomatous disease, chronic pain/polyarthralgia, chronic neck and back pain here for follow-up.   LESI last visit reports marginal relief.  Continues to have axial back pain radiating to hips occasionally.  Also complains of generalized myofascial pain and polyarthralgia.  Chronic pain significantly impairing ADL.  She has tried physical therapy for 6-week with marginal relief.  Chronic back pain radiating to bilateral hips and left lower extremity with burning tingling numbness in the leg and sharp pain worse with activity however constant.  Denies saddle anesthesia bladder bowel continence.  Chronic neck pain radiating to bilateral shoulders and upper thoracic paraspinal areas.  Denies upper extremity radicular pain.  States she had dealt with pain since she was 16 years old uncertain about her chronic pain symptoms thinking it is likely related to chronic granulomatous disease, she is filing for disability for these reasons.  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  Pain impairing her daily activity and sleep.  Have tried home exercise program, physical therapy in the past but cannot participate due to pain.    C-spine MRI 3/2023 stable MRI of cervical spine demonstrating minimal early spondylosis changes without area of associated spinal canal or neuroforaminal narrowing.  L-spine MRI 3/2023 multiple level lumbar spondylosis overall similar to previous however bilateral foraminal narrowing is present at L4-L5 moderate to severe may be mildly progressing from previous.  C-spine MRI 2022: Mild C4 C6 spondylosis. No herniated disc canal or neuroforaminal stenosis.   C-spine x-ray 2021 early degenerative changes C4-C5 and C5-C6.  L-spine MRI 2021 mild multilevel degenerative changes of lumbar spine no canal stenosis. Mild bilateral foraminal narrowing at L4-L5, mild left  foraminal narrowing L3-L4 and L5-S1.  Right knee MRI  mild patellar chondromalacia otherwise no evidence of internal derangement or significant degenerative change.    Pain Assessment   Location of Pain: All over  Description of Pain: Dull/Aching, Throbbing, Stabbing  Previous Pain Rating :6  Current Pain Ratin  Aggravating Factors: Activity  Alleviating Factors: Rest, Medication    PEG Assessment   What number best describes your pain on average in the past week?7  What number best describes how, during the past week, pain has interfered with your enjoyment of life?4  What number best describes how, during the past week, pain has interfered with your general activity?  10    The following portions of the patient's history were reviewed and updated as appropriate: allergies, current medications, past family history, past medical history, past social history, past surgical history and problem list.     has a past medical history of Anxiety, Arthritis, Cancer, COPD (chronic obstructive pulmonary disease), Depression, Enlarged liver, Genital herpes, GERD (gastroesophageal reflux disease), Granulomatosis, High cholesterol, Joint pain, Knee pain, Leg pain, Low back pain, Lung nodules, MRSA infection, Neck pain, and Numbness and tingling of both feet.   has a past surgical history that includes Hysterectomy; Breast surgery; Esophagogastroduodenoscopy; Colonoscopy (N/A, 2022); Other surgical history; Finger fracture surgery; and Tubal ligation.  Family history is unknown by patient.  Social History     Tobacco Use   • Smoking status: Every Day     Packs/day: 1.00     Types: Cigarettes     Passive exposure: Current   • Smokeless tobacco: Never   Substance Use Topics   • Alcohol use: Not Currently       Review of Systems   Musculoskeletal: Positive for arthralgias, back pain, myalgias and neck pain.   All other systems reviewed and are negative.      Objective   Physical Exam  Vitals reviewed.   Constitutional:        General: She is not in acute distress.     Appearance: She is obese.   Pulmonary:      Effort: Pulmonary effort is normal.   Musculoskeletal:      Cervical back: Tenderness present. Decreased range of motion.      Lumbar back: Tenderness present. Decreased range of motion. Positive left straight leg raise test.      Comments: Lumbar loading positive, pain on extension of low back past 5 degrees.  TTP on the lumbar facets noted.         /75   Pulse 86   Resp 16   SpO2 99%     PHQ 9 on chart  Opioid risk tool moderate risk (age, psych history, history of abuse)    Assessment & Plan   Diagnoses and all orders for this visit:    1. Chronic pain syndrome (Primary)  -     nabumetone (RELAFEN) 500 MG tablet; Take 1 tablet by mouth 2 (Two) Times a Day As Needed for Mild Pain.  Dispense: 60 tablet; Refill: 5  -     pregabalin (LYRICA) 75 MG capsule; Take 1 capsule by mouth 2 (Two) Times a Day.  Dispense: 60 capsule; Refill: 0    2. Lumbosacral spondylosis without myelopathy    3. Cervical spondylosis without myelopathy    4. Fibromyalgia  -     pregabalin (LYRICA) 75 MG capsule; Take 1 capsule by mouth 2 (Two) Times a Day.  Dispense: 60 capsule; Refill: 0    5. Polyarthralgia  -     Ambulatory Referral to Rheumatology    6. Myofascial pain syndrome  -     Ambulatory Referral to Rheumatology      Summary  Rand Schilling is a 37 y.o. female with history of chronic granulomatous disease, chronic pain/polyarthralgia, chronic neck and back pain here for follow-up.  Referred by PCP.  Chronic pain from lumbar and cervical DDD spondylosis, with radicular pain.  Chronic polyarthralgia generalized myofascial pain.    LESI last visit reports marginal relief.  Continues to have axial back pain radiating to hips occasionally.  Also complains of generalized myofascial pain and polyarthralgia.  Chronic pain significantly impairing ADL.  She has tried physical therapy for 6-week with marginal relief.  Will refer to rheumatology  for further evaluation of worsening polyarthralgia and generalized myofascial pain/fatigue.  We will start Lyrica for fibromyalgia symptoms.  Start nabumetone for polyarthralgia.    RTC 1 month

## 2023-04-27 PROBLEM — M25.562 PAIN IN BOTH KNEES: Status: ACTIVE | Noted: 2023-04-27

## 2023-04-27 PROBLEM — M19.049 HAND ARTHRITIS: Status: ACTIVE | Noted: 2023-04-27

## 2023-04-27 PROBLEM — M25.561 PAIN IN BOTH KNEES: Status: ACTIVE | Noted: 2023-04-27

## 2023-05-10 ENCOUNTER — HOSPITAL ENCOUNTER (OUTPATIENT)
Dept: SLEEP MEDICINE | Facility: HOSPITAL | Age: 38
End: 2023-05-10
Payer: MEDICAID

## 2023-05-10 DIAGNOSIS — R06.83 SNORING: ICD-10-CM

## 2023-05-10 PROCEDURE — 95806 SLEEP STUDY UNATT&RESP EFFT: CPT

## 2023-05-11 ENCOUNTER — TELEPHONE (OUTPATIENT)
Dept: PULMONOLOGY | Facility: HOSPITAL | Age: 38
End: 2023-05-11
Payer: MEDICAID

## 2023-05-11 NOTE — TELEPHONE ENCOUNTER
Dr. Jessica Staples (optometrist in [place], [place]) left  stating patient presented to her office yesterday with an eye infection.  Dr. Staples would like to rx oral antibx but wanted to check with Dr. Cagle first - is he ok with this and does he prefer a particular antibx?    I texted Dr. Cagle today with these questions.    Dr. Cagle responded with Doxycycline 100 mg bid.  I called Dr. Staples's office back and relayed info to front office person.

## 2023-05-15 ENCOUNTER — HOSPITAL ENCOUNTER (EMERGENCY)
Facility: HOSPITAL | Age: 38
Discharge: HOME | End: 2023-05-15
Attending: EMERGENCY MEDICINE
Payer: MEDICAID

## 2023-05-15 ENCOUNTER — APPOINTMENT (OUTPATIENT)
Dept: CT IMAGING | Facility: HOSPITAL | Age: 38
End: 2023-05-15
Payer: MEDICAID

## 2023-05-15 VITALS
WEIGHT: 185 LBS | HEART RATE: 107 BPM | DIASTOLIC BLOOD PRESSURE: 97 MMHG | HEIGHT: 58 IN | OXYGEN SATURATION: 99 % | RESPIRATION RATE: 18 BRPM | BODY MASS INDEX: 38.83 KG/M2 | TEMPERATURE: 97.5 F | SYSTOLIC BLOOD PRESSURE: 125 MMHG

## 2023-05-15 DIAGNOSIS — G51.0 LEFT-SIDED BELL'S PALSY: Primary | ICD-10-CM

## 2023-05-15 PROCEDURE — 63710000001 PREDNISONE PER 1 MG: Performed by: EMERGENCY MEDICINE

## 2023-05-15 PROCEDURE — 70450 CT HEAD/BRAIN W/O DYE: CPT

## 2023-05-15 PROCEDURE — 99283 EMERGENCY DEPT VISIT LOW MDM: CPT

## 2023-05-15 RX ORDER — PREDNISONE 20 MG/1
60 TABLET ORAL ONCE
Status: COMPLETED | OUTPATIENT
Start: 2023-05-15 | End: 2023-05-15

## 2023-05-15 RX ORDER — VALACYCLOVIR HYDROCHLORIDE 1 G/1
1000 TABLET, FILM COATED ORAL 3 TIMES DAILY
Qty: 21 TABLET | Refills: 0 | Status: SHIPPED | OUTPATIENT
Start: 2023-05-15 | End: 2023-05-22

## 2023-05-15 RX ORDER — MINERAL OIL, PETROLATUM 425; 568 MG/G; MG/G
OINTMENT OPHTHALMIC
Qty: 7 G | Refills: 0 | Status: SHIPPED | OUTPATIENT
Start: 2023-05-15

## 2023-05-15 RX ORDER — PREDNISONE 20 MG/1
60 TABLET ORAL DAILY
Qty: 21 TABLET | Refills: 0 | Status: SHIPPED | OUTPATIENT
Start: 2023-05-15 | End: 2023-05-22

## 2023-05-15 RX ADMIN — PREDNISONE 60 MG: 20 TABLET ORAL at 18:35

## 2023-05-15 NOTE — DISCHARGE INSTRUCTIONS
Please take medications as prescribed.  Please do not take prednisone this evening as you were given the first dose tonight.  Start taking the prednisone tomorrow.  Follow-up with your provider.  Seek immediate medical attention if having worsening symptoms or any concerns.

## 2023-05-15 NOTE — ED NOTES
Pt presents to the ED with c/o left sided facial droop she woke up with today. Pt repots she has been treated for a lung infection and eye infection since traveling to [place] on 5/4. VSS NIH 0.

## 2023-05-15 NOTE — FSED PROVIDER NOTE
Subjective   History of Present Illness  Patient is a 38-year-old woman who for approximately the past 10 days has been having URI symptoms with drainage from both eyes blurred vision.  He has associated nasal congestion and cough.  The patient been seen in urgent care and was placed on Ciloxan and Z-James.  Patient then followed up with an ophthalmologist who advised the patient that her visual disturbances were from bilateral corneal abrasions.  She was placed on Polytrim and also soft contact lens to protect the eyes.  She was also started on doxycycline.  Patient states she is doing much better with her URI symptoms however today she took a nap around noon and woke up at 2:30 PM and upon her rising noted facial droop to the left side of the face.  Facial paralysis includes the forehead.  Patient denies any trauma or neck pain or difficulty with ambulation.  No motor weakness to the upper or lower extremities.  No sensory deficits.        Review of Systems    Past Medical History:   Diagnosis Date   • Anxiety    • Arthritis    • Cancer     cervical 2003   • COPD (chronic obstructive pulmonary disease)    • Depression    • Enlarged liver    • Genital herpes    • GERD (gastroesophageal reflux disease)    • Granulomatosis    • High cholesterol    • Joint pain    • Knee pain    • Leg pain     left-- with sharp pains and numbness   • Low back pain    • Lung nodules    • MRSA infection     2010    • Neck pain    • Numbness and tingling of both feet        Allergies   Allergen Reactions   • Penicillins Anaphylaxis, Hives and Swelling       Past Surgical History:   Procedure Laterality Date   • BREAST SURGERY      rt-- mRSA infection   • COLONOSCOPY N/A 1/21/2022    Procedure: COLONOSCOPY with large intestine tissue biopsy, rectal polyps, internal hemorrhoids;  Surgeon: Alberto Pearson MD;  Location: [facility];  Service: Gastroenterology;  Laterality: N/A;  post op: random large intestine tissue biopsy, rectum  polyp x5   • ENDOSCOPY     • FINGER FRACTURE SURGERY      rt little  finger   • HYSTERECTOMY     • OTHER SURGICAL HISTORY      leep procedure- removed cancer cells -- 2003   • TUBAL ABDOMINAL LIGATION         Family History   Family history unknown: Yes       Social History     Socioeconomic History   • Marital status: Single   Tobacco Use   • Smoking status: Every Day     Packs/day: 1.00     Types: Cigarettes     Passive exposure: Current   • Smokeless tobacco: Never   Vaping Use   • Vaping Use: Never used   Substance and Sexual Activity   • Alcohol use: Not Currently   • Drug use: Defer     Comment: 1/24/22 uses delta 8- pt stated for pain   • Sexual activity: Defer           Objective   Physical Exam  Vitals and nursing note reviewed.   Constitutional:       General: She is not in acute distress.     Appearance: Normal appearance. She is not ill-appearing or toxic-appearing.   HENT:      Head: Normocephalic and atraumatic.      Right Ear: Tympanic membrane and ear canal normal.      Left Ear: Tympanic membrane and ear canal normal.      Nose: Nose normal.      Mouth/Throat:      Mouth: Mucous membranes are moist.      Pharynx: Oropharynx is clear.   Eyes:      Extraocular Movements: Extraocular movements intact.      Conjunctiva/sclera: Conjunctivae normal.      Pupils: Pupils are equal, round, and reactive to light.   Cardiovascular:      Rate and Rhythm: Normal rate and regular rhythm.      Pulses: Normal pulses.      Heart sounds: Normal heart sounds.   Pulmonary:      Effort: Pulmonary effort is normal.      Breath sounds: Normal breath sounds.   Abdominal:      Palpations: Abdomen is soft.      Tenderness: There is no abdominal tenderness.   Musculoskeletal:         General: No swelling or tenderness. Normal range of motion.      Cervical back: Normal range of motion and neck supple.      Right lower leg: No edema.      Left lower leg: No edema.   Skin:     General: Skin is warm and dry.      Capillary  Refill: Capillary refill takes less than 2 seconds.   Neurological:      Mental Status: She is alert.      Coordination: Coordination normal.      Gait: Gait normal.      Comments: Neurological examination with Bell's palsy findings with decreased motor to the forehead and closing of left upper eyelid and left corner of the mouth drooping.  Normal motor and sensory to upper and lower extremities.  Normal gait.  Normal finger-to-nose bilaterally.         Procedures           ED Course                                           MDM   Patient is a 38-year-old woman who presents with Bell's palsy symptoms which began earlier today.  Patient took a nap at noon and woke up at 230 with symptoms.  Symptoms are on the left side including the forehead and raising of the left eyebrow and closing the left upper eyelid in the corner of the mouth.  Patient has been started on 60 mg of prednisone.  Unfortunately do not have any Valtrex or acyclovir here.  Patient will be also prescribed Valtrex 1 g 3 times a day for 7 days.  Patient will undergo CT of the head to evaluate for any other cause of the facial nerve palsy.    1900: Patient turned over to oncoming provider.  Await CT of the head.  Anticipate unremarkable examination and patient to be discharged to home.    CT of the head was unremarkable.  Patient discharged to home with left-sided Bell's palsy.    Final diagnoses:   Left-sided Bell's palsy       ED Disposition  ED Disposition     ED Disposition   Discharge    Condition   Stable    Comment   --             Edwar Correa MD  [address] [facility]  [address]  [place] [address] [phone]    In 1 week      [place] [address] [place] [id number] [phone]    If symptoms worsen         Medication List      New Prescriptions    artificial tears ointment ophthalmic ointment  Administer  to both eyes Every 1 (One) Hour As Needed (dry eye).     predniSONE 20  MG tablet  Commonly known as: DELTASONE  Take 3 tablets by mouth Daily for 7 days.        Changed    valACYclovir 1000 MG tablet  Commonly known as: VALTREX  Take 1 tablet by mouth 3 (Three) Times a Day for 7 days.  What changed:   · medication strength  · how much to take  · when to take this           Where to Get Your Medications      These medications were sent to [facility]/pharmacy #[id number] - [place], [place] - [address] [facility] - [phone]  - [phone] [address] [place] [address]    Hours: 24-hours Phone: [phone]   · artificial tears ointment ophthalmic ointment  · predniSONE 20 MG tablet  · valACYclovir 1000 MG tablet        None

## 2023-05-17 ENCOUNTER — LAB (OUTPATIENT)
Dept: FAMILY MEDICINE CLINIC | Facility: CLINIC | Age: 38
End: 2023-05-17
Payer: MEDICAID

## 2023-05-17 ENCOUNTER — OFFICE VISIT (OUTPATIENT)
Dept: FAMILY MEDICINE CLINIC | Facility: CLINIC | Age: 38
End: 2023-05-17
Payer: MEDICAID

## 2023-05-17 VITALS
WEIGHT: 182.6 LBS | SYSTOLIC BLOOD PRESSURE: 116 MMHG | DIASTOLIC BLOOD PRESSURE: 84 MMHG | HEART RATE: 98 BPM | OXYGEN SATURATION: 97 % | TEMPERATURE: 98.4 F | BODY MASS INDEX: 38.16 KG/M2

## 2023-05-17 DIAGNOSIS — R06.09 DYSPNEA ON EXERTION: ICD-10-CM

## 2023-05-17 DIAGNOSIS — D71 CHRONIC GRANULOMATOUS DISEASE: ICD-10-CM

## 2023-05-17 DIAGNOSIS — G89.29 CHRONIC MIDLINE LOW BACK PAIN WITH LEFT-SIDED SCIATICA: ICD-10-CM

## 2023-05-17 DIAGNOSIS — G51.0 BELL'S PALSY: ICD-10-CM

## 2023-05-17 DIAGNOSIS — M54.42 CHRONIC MIDLINE LOW BACK PAIN WITH LEFT-SIDED SCIATICA: ICD-10-CM

## 2023-05-17 DIAGNOSIS — D71 CHRONIC GRANULOMATOUS DISEASE: Primary | ICD-10-CM

## 2023-05-17 PROCEDURE — 36415 COLL VENOUS BLD VENIPUNCTURE: CPT | Performed by: FAMILY MEDICINE

## 2023-05-17 PROCEDURE — 80053 COMPREHEN METABOLIC PANEL: CPT | Performed by: FAMILY MEDICINE

## 2023-05-17 PROCEDURE — 85025 COMPLETE CBC W/AUTO DIFF WBC: CPT | Performed by: FAMILY MEDICINE

## 2023-05-17 PROCEDURE — 99214 OFFICE O/P EST MOD 30 MIN: CPT | Performed by: FAMILY MEDICINE

## 2023-05-17 RX ORDER — ALBUTEROL SULFATE 0.63 MG/3ML
1 SOLUTION RESPIRATORY (INHALATION) EVERY 6 HOURS PRN
Qty: 360 ML | Refills: 12 | Status: SHIPPED | OUTPATIENT
Start: 2023-05-17

## 2023-05-17 RX ORDER — CYANOCOBALAMIN 1000 UG/ML
1000 INJECTION, SOLUTION INTRAMUSCULAR; SUBCUTANEOUS ONCE
Status: COMPLETED | OUTPATIENT
Start: 2023-05-17 | End: 2023-05-17

## 2023-05-17 RX ORDER — BUDESONIDE 1 MG/2ML
1 INHALANT ORAL
Qty: 60 ML | Refills: 3 | Status: SHIPPED | OUTPATIENT
Start: 2023-05-17

## 2023-05-17 RX ADMIN — CYANOCOBALAMIN 1000 MCG: 1000 INJECTION, SOLUTION INTRAMUSCULAR; SUBCUTANEOUS at 15:39

## 2023-05-17 NOTE — PROGRESS NOTES
Subjective   Rand Schilling is a 38 y.o. female.     History of Present Illness  Rand Schilling is in for follow up on her chronic issues with chronic lung disease caused by chronic granulomatous disease and chronic back pain issues.  She also has an acute concern of late. She went out to [place] to help a friend and came back with a cough and increased shortness of breath, along with a bout of Bell's palsy.  She has been having some headaches which is not unusual with Bell's palsy.  She has been having some production to her cough which bothers her.  She is chronically on inhalers but of late has not been able to use them effectively because of the mouth dysfunction related to her Bell's palsy. There is no history of chest pain or dyspnea. There is no history of issue with bowel dysfunction. There is some history of dizziness but not lightheadedness.  She is prone to chronic bladder issues..  She is anxious about the entire situation and is not sleeping well as a result.  There is no history of issue with present medication.            /84 (BP Location: Left arm, Patient Position: Sitting, Cuff Size: Large Adult)   Pulse 98   Temp 98.4 °F (36.9 °C) (Temporal)   Wt 82.8 kg (182 lb 9.6 oz)   SpO2 97%   BMI 38.16 kg/m²       Chief Complaint   Patient presents with   • medicine refill     3 month f/u - no labs done at all at any place    • Hospital Follow Up Visit     [facility] 05/15/23 - left 4-27 to [place] and they drove. 4-30 and tooth started hurting - fibro attack (a sign); 5-1 sleep all day couldn't breathe- felt like she had dirt everything - used her rescue inhaler- never been before to [place] - felt weak - in an RV 5-2 left to come home 3hrs into trip green stuff out of eyes, nose and coughing up stuff, 5-4 made it home and that afternoon went to [facility] - injection, zpak,eye drops- finished that - over that weekend loosing her vision called eye Dr.     • Eye Problem     They put in antibiotic contact  lenses 5/10- told her stop eye drops from urgent care and start polymyxin tmp drops and refresh plus eye drops OTC every hr. 5/15 woke up from nap at 2:30 and face dropping and eye wont shut - 5/16 went back to eye dr - contacts came out on their on and left eye could remain open - when eye is uncovered left eye every 30 min but covered every hr refresh eye drops Dr. 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            Current Outpatient Medications:   •  albuterol sulfate  (90 Base) MCG/ACT inhaler, Inhale 2 puffs Every 6 (Six) Hours As Needed for Wheezing or Shortness of Air., Disp: 18 g, Rfl: 5  •  artificial tears (REFRESH LACRI-LUBE) ointment ophthalmic ointment, Administer  to both eyes Every 1 (One) Hour As Needed (dry eye)., Disp: 7 g, Rfl: 0  •  budesonide-formoterol (SYMBICORT) 80-4.5 MCG/ACT inhaler, Inhale 2 puffs 2 (Two) Times a Day., Disp: 1 each, Rfl: 5  •  busPIRone (BUSPAR) 10 MG tablet, Take 1 tablet by mouth 2 (Two) Times a Day., Disp: , Rfl:   •  Cranberry 500 MG capsule, Take  by mouth., Disp: , Rfl:   •  cyanocobalamin (VITAMIN B-12) 2000 MCG tablet, Take 1 tablet by mouth Daily., Disp: , Rfl:   •  DULoxetine (CYMBALTA) 60 MG capsule, Take 1 capsule by mouth Daily., Disp: , Rfl:   •  famotidine (PEPCID) 40 MG tablet, Take 1 tablet by mouth every night at bedtime., Disp: , Rfl:   •  nabumetone (RELAFEN) 500 MG tablet, Take 1 tablet by mouth 2 (Two) Times a Day As Needed for Mild Pain., Disp: 60 tablet, Rfl: 5  •  NON FORMULARY, Delta 8, Disp: , Rfl:   •  pantoprazole (PROTONIX) 40 MG EC tablet, Take 1 tablet by mouth Daily., Disp: , Rfl:   •  phentermine (ADIPEX-P) 37.5 MG tablet, Take 1 tablet by mouth Every Morning Before Breakfast. 4/19/23-- has at home to take-- currently not using the pills, Disp: , Rfl:   •  predniSONE (DELTASONE) 20 MG tablet, Take 3 tablets by mouth Daily for 7 days., Disp: 21 tablet, Rfl: 0  •  pregabalin (LYRICA) 75 MG capsule, Take 1 capsule by mouth 2 (Two)  Times a Day., Disp: 60 capsule, Rfl: 0  •  Quercetin 500 MG capsule, Take  by mouth., Disp: , Rfl:   •  senna (SENOKOT) 8.6 MG tablet, Take 1 tablet by mouth 2 (Two) Times a Day. (takes 2 at night  Together), Disp: , Rfl:   •  sennosides-docusate (PERICOLACE) 8.6-50 MG per tablet, Take 2 tablets by mouth Every Night., Disp: , Rfl:   •  spironolactone (ALDACTONE) 50 MG tablet, Take 2 tablets by mouth 2 (two) times a day., Disp: , Rfl:   •  valACYclovir (VALTREX) 1000 MG tablet, Take 1 tablet by mouth 3 (Three) Times a Day for 7 days., Disp: 21 tablet, Rfl: 0  •  Zinc 25 MG tablet, Take 1 tablet by mouth Daily., Disp: , Rfl:   •  albuterol (ACCUNEB) 0.63 MG/3ML nebulizer solution, Take 3 mL by nebulization Every 6 (Six) Hours As Needed for Wheezing., Disp: 360 mL, Rfl: 12  •  budesonide (Pulmicort) 1 MG/2ML nebulizer solution, Take 2 mL by nebulization Daily., Disp: 60 mL, Rfl: 3        The following portions of the patient's history were reviewed and updated as appropriate: allergies, current medications, past family history, past medical history, past social history, past surgical history, and problem list.    Review of Systems   Constitutional: Positive for fatigue. Negative for activity change and fever.   HENT: Negative for congestion, sinus pressure, sinus pain, sore throat and trouble swallowing.    Eyes: Positive for visual disturbance.   Respiratory: Positive for cough and shortness of breath. Negative for chest tightness and wheezing.    Cardiovascular: Negative for chest pain.   Gastrointestinal: Positive for constipation. Negative for abdominal distention, abdominal pain, diarrhea, nausea and vomiting.   Genitourinary: Positive for frequency and urgency. Negative for difficulty urinating and dysuria.   Musculoskeletal: Positive for back pain. Negative for neck pain.   Skin: Negative for rash.   Neurological: Positive for numbness and headaches. Negative for dizziness and weakness.   Psychiatric/Behavioral:  Positive for sleep disturbance. Negative for agitation, decreased concentration, hallucinations and suicidal ideas. The patient is nervous/anxious.        Objective   Physical Exam  Vitals and nursing note reviewed.   Constitutional:       Appearance: She is obese.   HENT:      Right Ear: Tympanic membrane and ear canal normal.      Left Ear: Tympanic membrane and ear canal normal.   Eyes:      Conjunctiva/sclera: Conjunctivae normal.   Cardiovascular:      Rate and Rhythm: Normal rate and regular rhythm.      Heart sounds: Normal heart sounds.   Pulmonary:      Effort: Pulmonary effort is normal.      Breath sounds: No wheezing or rales.   Abdominal:      General: Bowel sounds are normal.      Palpations: Abdomen is soft.      Tenderness: There is no abdominal tenderness. There is no guarding.   Musculoskeletal:      Cervical back: Neck supple.      Right lower leg: No edema.      Left lower leg: No edema.   Lymphadenopathy:      Cervical: No cervical adenopathy.   Neurological:      Mental Status: She is alert and oriented to person, place, and time.      Comments: Left sided facial weakness related to Bell's palsy   Psychiatric:         Mood and Affect: Mood normal.           Assessment & Plan   Problems Addressed this Visit    None  Visit Diagnoses     Chronic granulomatous disease    -  Primary    Relevant Orders    Home Nebulizer    CT Chest With Contrast    Chronic midline low back pain with left-sided sciatica        Bell's palsy        Relevant Orders    MRI Brain With & Without Contrast    Dyspnea on exertion        Relevant Orders    CT Chest With Contrast      Diagnoses       Codes Comments    Chronic granulomatous disease    -  Primary ICD-10-CM: D71  ICD-9-CM: 288.1     Chronic midline low back pain with left-sided sciatica     ICD-10-CM: M54.42, G89.29  ICD-9-CM: 724.2, 724.3, 338.29     Bell's palsy     ICD-10-CM: G51.0  ICD-9-CM: 351.0     Dyspnea on exertion     ICD-10-CM: R06.09  ICD-9-CM: 786.09            I will order her nebulizer because she is just not able to use her inhalers safely or adequately at this time  I will get an MRI of her head to make sure that we are not making an assumption about the Bell's palsy  I will send for a sputum culture because of the cough which is a change for her, and I will also attempt to get a CT of her chest  I am concerned because she spent significant period of time in [place] in the desert climate and there are fungi that she could have been exposed to  I will see her back as indicated  I did reassure her that the Bell's palsy should resolve on its own but that it could take several weeks

## 2023-05-18 ENCOUNTER — PATIENT MESSAGE (OUTPATIENT)
Dept: FAMILY MEDICINE CLINIC | Facility: CLINIC | Age: 38
End: 2023-05-18

## 2023-05-18 ENCOUNTER — PATIENT MESSAGE (OUTPATIENT)
Dept: FAMILY MEDICINE CLINIC | Facility: CLINIC | Age: 38
End: 2023-05-18
Payer: MEDICAID

## 2023-05-18 LAB
ALBUMIN SERPL-MCNC: 4.2 G/DL (ref 3.5–5.2)
ALBUMIN/GLOB SERPL: 1.4 G/DL
ALP SERPL-CCNC: 84 U/L (ref 39–117)
ALT SERPL W P-5'-P-CCNC: 18 U/L (ref 1–33)
ANION GAP SERPL CALCULATED.3IONS-SCNC: 9.4 MMOL/L (ref 5–15)
AST SERPL-CCNC: 16 U/L (ref 1–32)
BASOPHILS # BLD AUTO: 0.03 10*3/MM3 (ref 0–0.2)
BASOPHILS NFR BLD AUTO: 0.2 % (ref 0–1.5)
BILIRUB SERPL-MCNC: 0.2 MG/DL (ref 0–1.2)
BUN SERPL-MCNC: 12 MG/DL (ref 6–20)
BUN/CREAT SERPL: 13.8 (ref 7–25)
CALCIUM SPEC-SCNC: 9.4 MG/DL (ref 8.6–10.5)
CHLORIDE SERPL-SCNC: 102 MMOL/L (ref 98–107)
CO2 SERPL-SCNC: 25.6 MMOL/L (ref 22–29)
CREAT SERPL-MCNC: 0.87 MG/DL (ref 0.57–1)
DEPRECATED RDW RBC AUTO: 45.7 FL (ref 37–54)
EGFRCR SERPLBLD CKD-EPI 2021: 88.1 ML/MIN/1.73
EOSINOPHIL # BLD AUTO: 0.05 10*3/MM3 (ref 0–0.4)
EOSINOPHIL NFR BLD AUTO: 0.4 % (ref 0.3–6.2)
ERYTHROCYTE [DISTWIDTH] IN BLOOD BY AUTOMATED COUNT: 12.9 % (ref 12.3–15.4)
GLOBULIN UR ELPH-MCNC: 2.9 GM/DL
GLUCOSE SERPL-MCNC: 133 MG/DL (ref 65–99)
HCT VFR BLD AUTO: 43.7 % (ref 34–46.6)
HGB BLD-MCNC: 14.6 G/DL (ref 12–15.9)
IMM GRANULOCYTES # BLD AUTO: 0.05 10*3/MM3 (ref 0–0.05)
IMM GRANULOCYTES NFR BLD AUTO: 0.4 % (ref 0–0.5)
LYMPHOCYTES # BLD AUTO: 2.42 10*3/MM3 (ref 0.7–3.1)
LYMPHOCYTES NFR BLD AUTO: 19.9 % (ref 19.6–45.3)
MCH RBC QN AUTO: 32.6 PG (ref 26.6–33)
MCHC RBC AUTO-ENTMCNC: 33.4 G/DL (ref 31.5–35.7)
MCV RBC AUTO: 97.5 FL (ref 79–97)
MONOCYTES # BLD AUTO: 0.63 10*3/MM3 (ref 0.1–0.9)
MONOCYTES NFR BLD AUTO: 5.2 % (ref 5–12)
NEUTROPHILS NFR BLD AUTO: 73.9 % (ref 42.7–76)
NEUTROPHILS NFR BLD AUTO: 8.99 10*3/MM3 (ref 1.7–7)
NRBC BLD AUTO-RTO: 0 /100 WBC (ref 0–0.2)
PLATELET # BLD AUTO: 179 10*3/MM3 (ref 140–450)
PMV BLD AUTO: 12.5 FL (ref 6–12)
POTASSIUM SERPL-SCNC: 3.8 MMOL/L (ref 3.5–5.2)
PROT SERPL-MCNC: 7.1 G/DL (ref 6–8.5)
RBC # BLD AUTO: 4.48 10*6/MM3 (ref 3.77–5.28)
SODIUM SERPL-SCNC: 137 MMOL/L (ref 136–145)
WBC NRBC COR # BLD: 12.17 10*3/MM3 (ref 3.4–10.8)

## 2023-05-24 ENCOUNTER — PATIENT MESSAGE (OUTPATIENT)
Dept: FAMILY MEDICINE CLINIC | Facility: CLINIC | Age: 38
End: 2023-05-24

## 2023-05-24 DIAGNOSIS — G51.0 BELL'S PALSY: Primary | ICD-10-CM

## 2023-05-24 RX ORDER — CELECOXIB AND TRAMADOL HYDROCHLORIDE 56; 44 MG/1; MG/1
2 TABLET ORAL 2 TIMES DAILY PRN
Qty: 40 TABLET | Refills: 0 | Status: SHIPPED | OUTPATIENT
Start: 2023-05-24 | End: 2023-05-25

## 2023-05-25 ENCOUNTER — OFFICE VISIT (OUTPATIENT)
Dept: PAIN MEDICINE | Facility: CLINIC | Age: 38
End: 2023-05-25
Payer: MEDICAID

## 2023-05-25 VITALS
DIASTOLIC BLOOD PRESSURE: 57 MMHG | HEART RATE: 75 BPM | RESPIRATION RATE: 16 BRPM | SYSTOLIC BLOOD PRESSURE: 91 MMHG | OXYGEN SATURATION: 98 %

## 2023-05-25 DIAGNOSIS — M79.7 FIBROMYALGIA: ICD-10-CM

## 2023-05-25 DIAGNOSIS — G89.4 CHRONIC PAIN SYNDROME: Primary | ICD-10-CM

## 2023-05-25 DIAGNOSIS — M47.812 CERVICAL SPONDYLOSIS WITHOUT MYELOPATHY: ICD-10-CM

## 2023-05-25 DIAGNOSIS — M79.18 MYOFASCIAL PAIN SYNDROME: ICD-10-CM

## 2023-05-25 DIAGNOSIS — G51.0 BELL'S PALSY: Primary | ICD-10-CM

## 2023-05-25 DIAGNOSIS — M47.817 LUMBOSACRAL SPONDYLOSIS WITHOUT MYELOPATHY: ICD-10-CM

## 2023-05-25 DIAGNOSIS — M25.50 POLYARTHRALGIA: ICD-10-CM

## 2023-05-25 RX ORDER — PREGABALIN 75 MG/1
75 CAPSULE ORAL 3 TIMES DAILY
Qty: 90 CAPSULE | Refills: 2 | Status: SHIPPED | OUTPATIENT
Start: 2023-05-25

## 2023-05-25 RX ORDER — TRAMADOL HYDROCHLORIDE 50 MG/1
50 TABLET ORAL EVERY 8 HOURS PRN
Qty: 60 TABLET | Refills: 0 | Status: SHIPPED | OUTPATIENT
Start: 2023-05-25

## 2023-05-25 RX ORDER — NABUMETONE 500 MG/1
500 TABLET, FILM COATED ORAL 2 TIMES DAILY PRN
Qty: 60 TABLET | Refills: 5 | Status: SHIPPED | OUTPATIENT
Start: 2023-05-25

## 2023-05-25 RX ORDER — VALACYCLOVIR HYDROCHLORIDE 500 MG/1
TABLET, FILM COATED ORAL
COMMUNITY
Start: 2023-05-18

## 2023-05-25 RX ORDER — CELECOXIB 200 MG/1
200 CAPSULE ORAL 2 TIMES DAILY PRN
Qty: 60 CAPSULE | Refills: 1 | Status: SHIPPED | OUTPATIENT
Start: 2023-05-25 | End: 2023-05-25
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
## 2023-05-26 ENCOUNTER — PATIENT MESSAGE (OUTPATIENT)
Dept: FAMILY MEDICINE CLINIC | Facility: CLINIC | Age: 38
End: 2023-05-26

## 2023-05-26 ENCOUNTER — TELEPHONE (OUTPATIENT)
Dept: FAMILY MEDICINE CLINIC | Facility: CLINIC | Age: 38
End: 2023-05-26
Payer: MEDICAID

## 2023-05-26 NOTE — TELEPHONE ENCOUNTER
Tramadol 50 mg tablet prior authorization approved on covermymeds.     Key: [id number] - PA Case ID: [id number]    PA Case: [id number], Status: Approved, Coverage Starts on: 5/26/2023 12:00:00 AM, Coverage Ends on: 6/25/2023 12:00:00 AM.    Information sent to the pharmacy.

## 2023-05-29 DIAGNOSIS — G47.33 OSA (OBSTRUCTIVE SLEEP APNEA): Primary | ICD-10-CM

## 2023-05-31 ENCOUNTER — TELEPHONE (OUTPATIENT)
Dept: FAMILY MEDICINE CLINIC | Facility: CLINIC | Age: 38
End: 2023-05-31

## 2023-05-31 DIAGNOSIS — R06.09 DYSPNEA ON EXERTION: Primary | ICD-10-CM

## 2023-05-31 DIAGNOSIS — D71 CHRONIC GRANULOMATOUS DISEASE: ICD-10-CM

## 2023-05-31 DIAGNOSIS — R06.09 DYSPNEA ON EXERTION: ICD-10-CM

## 2023-05-31 NOTE — TELEPHONE ENCOUNTER
"Pts CT chest was denied. I will copy and paste their reasoning so you will know why. I will close out the CT order......\"Your doctor told us that you have trouble breathing. Your doctor ordered a CT scan of your chest. A CT is a way to take pictures of the inside of your body. This test is needed when all of the following criteria are met. 1) You should not have certain conditions that makes breathing hard (chronic obstructive pulmonary disease or COPD and asthma). 2) The cause of your breathing problems is not due to heart problems. You should have your heart checked. 3) Chest x-ray results were normal or unclear. We reviewed the notes we have. The notes do not show that you meet all of the required criteria. As a result, this test is not medically necessary. We used [facility] 27th Edition guideline A-0028 titled Chest CT Scan to make this decision.\"  "

## 2023-06-02 ENCOUNTER — PATIENT MESSAGE (OUTPATIENT)
Dept: FAMILY MEDICINE CLINIC | Facility: CLINIC | Age: 38
End: 2023-06-02

## 2023-07-13 ENCOUNTER — OFFICE (AMBULATORY)
Dept: URBAN - METROPOLITAN AREA CLINIC 64 | Facility: CLINIC | Age: 38
End: 2023-07-13
Payer: COMMERCIAL

## 2023-07-13 VITALS — HEIGHT: 60 IN

## 2023-07-13 DIAGNOSIS — K64.0 FIRST DEGREE HEMORRHOIDS: ICD-10-CM

## 2023-07-13 PROCEDURE — 46221 LIGATION OF HEMORRHOID(S): CPT | Performed by: INTERNAL MEDICINE

## 2023-08-03 ENCOUNTER — OFFICE (AMBULATORY)
Dept: URBAN - METROPOLITAN AREA CLINIC 64 | Facility: CLINIC | Age: 38
End: 2023-08-03
Payer: COMMERCIAL

## 2023-08-03 VITALS — HEIGHT: 60 IN

## 2023-08-03 DIAGNOSIS — K64.1 SECOND DEGREE HEMORRHOIDS: ICD-10-CM

## 2023-08-03 PROCEDURE — 46221 LIGATION OF HEMORRHOID(S): CPT | Performed by: INTERNAL MEDICINE

## 2023-08-07 ENCOUNTER — TELEPHONE (OUTPATIENT)
Dept: FAMILY MEDICINE CLINIC | Facility: CLINIC | Age: 38
End: 2023-08-07
Payer: MEDICAID

## 2023-08-07 NOTE — TELEPHONE ENCOUNTER
Budesonide 1MG/2ML suspension PA sent to covermymeds. Waiting on determination. Previous PA completed and denied but pt says she was able to fill(see note from 7/13/23). I spoke with pharmacy, PA is needed. They cannot fill. PA initiated.     Key: [id number] - PA Case ID: [id number] - Rx #: [id number]

## 2023-08-09 NOTE — TELEPHONE ENCOUNTER
PA cancelled due to duplicate request. Tried to call pt to see if she needs alternative prescription or if able to . No answer. Asked her to call the office. She was able to still  last prescription and was to call if unable to get next refill(see note from 7/13/2023).

## 2023-08-09 NOTE — TELEPHONE ENCOUNTER
Pt left vm. Insurance covered Budesonide before but will not cover now. She cannot use inhalers due to left sided paralysis from bells palsy. I called to appeal budesonide suspension PA. Insurance is having system updates and cannot take any calls at this time. Call tomorrow to appeal. [phone]

## 2023-08-10 DIAGNOSIS — J44.9 CHRONIC OBSTRUCTIVE PULMONARY DISEASE, UNSPECIFIED COPD TYPE: ICD-10-CM

## 2023-08-10 RX ORDER — BUDESONIDE AND FORMOTEROL FUMARATE DIHYDRATE 80; 4.5 UG/1; UG/1
2 AEROSOL RESPIRATORY (INHALATION) 2 TIMES DAILY
Qty: 1 EACH | Refills: 5 | Status: SHIPPED | OUTPATIENT
Start: 2023-08-10

## 2023-08-10 RX ORDER — INHALER, ASSIST DEVICES
SPACER (EA) MISCELLANEOUS
Qty: 1 EACH | Refills: 3 | Status: SHIPPED | OUTPATIENT
Start: 2023-08-10

## 2023-08-10 NOTE — TELEPHONE ENCOUNTER
Dr. Teixeira, I received a PA request for Budesonide in July, this was denied but pt was still able to  prescription. Now she needs refill and cannot  as PA is needed. I tried another PA but it was cancelled as there is a PA already on file for same medication. I called to complete appeal today but was instructed this had to be in writing. They need letter from provider stating why she needs budesonide suspension for her nebulizer instead of one of the preferred inhalers. I spoke with patient and she still cannot use her inhalers due to left sided paralysis from bells palsy and needs nebulizer instead. Can you complete letter for appeal on why she needs this?   Fax for appeal letter to be sent : [phone]  PA case : [id number]

## 2023-08-17 ENCOUNTER — OFFICE VISIT (OUTPATIENT)
Dept: PULMONOLOGY | Facility: HOSPITAL | Age: 38
End: 2023-08-17
Payer: MEDICAID

## 2023-08-17 VITALS
OXYGEN SATURATION: 97 % | BODY MASS INDEX: 36.73 KG/M2 | RESPIRATION RATE: 14 BRPM | HEIGHT: 58 IN | HEART RATE: 87 BPM | WEIGHT: 175 LBS | DIASTOLIC BLOOD PRESSURE: 76 MMHG | SYSTOLIC BLOOD PRESSURE: 108 MMHG

## 2023-08-17 DIAGNOSIS — F17.210 CIGARETTE NICOTINE DEPENDENCE WITHOUT COMPLICATION: ICD-10-CM

## 2023-08-17 DIAGNOSIS — G47.33 OSA ON CPAP: Primary | ICD-10-CM

## 2023-08-17 DIAGNOSIS — Z99.89 OSA ON CPAP: Primary | ICD-10-CM

## 2023-08-17 DIAGNOSIS — G51.0 FACIAL PARALYSIS/BELLS PALSY: ICD-10-CM

## 2023-08-17 DIAGNOSIS — R06.02 SHORTNESS OF BREATH ON EXERTION: ICD-10-CM

## 2023-08-17 DIAGNOSIS — J44.9 CHRONIC OBSTRUCTIVE PULMONARY DISEASE, UNSPECIFIED COPD TYPE: ICD-10-CM

## 2023-08-17 PROCEDURE — G0463 HOSPITAL OUTPT CLINIC VISIT: HCPCS

## 2023-08-17 RX ORDER — BUDESONIDE 0.5 MG/2ML
0.5 INHALANT ORAL
Qty: 60 EACH | Refills: 5 | Status: SHIPPED | OUTPATIENT
Start: 2023-08-17

## 2023-08-17 RX ORDER — CELECOXIB 200 MG/1
200 CAPSULE ORAL 2 TIMES DAILY PRN
COMMUNITY
Start: 2023-05-25
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
## 2023-09-11 DIAGNOSIS — M79.7 FIBROMYALGIA: ICD-10-CM

## 2023-09-11 DIAGNOSIS — G89.4 CHRONIC PAIN SYNDROME: ICD-10-CM

## 2023-09-11 RX ORDER — PREGABALIN 75 MG/1
CAPSULE ORAL
Qty: 90 CAPSULE | Refills: 2 | OUTPATIENT
Start: 2023-09-11

## 2023-10-04 ENCOUNTER — OFFICE VISIT (OUTPATIENT)
Dept: PAIN MEDICINE | Facility: CLINIC | Age: 38
End: 2023-10-04
Payer: MEDICAID

## 2023-10-04 VITALS
DIASTOLIC BLOOD PRESSURE: 64 MMHG | SYSTOLIC BLOOD PRESSURE: 104 MMHG | OXYGEN SATURATION: 98 % | RESPIRATION RATE: 16 BRPM | HEART RATE: 63 BPM

## 2023-10-04 DIAGNOSIS — G89.4 CHRONIC PAIN SYNDROME: Primary | ICD-10-CM

## 2023-10-04 DIAGNOSIS — M79.7 FIBROMYALGIA: ICD-10-CM

## 2023-10-04 DIAGNOSIS — M54.16 LUMBAR RADICULITIS: ICD-10-CM

## 2023-10-04 DIAGNOSIS — M47.817 LUMBOSACRAL SPONDYLOSIS WITHOUT MYELOPATHY: ICD-10-CM

## 2023-10-04 DIAGNOSIS — M47.812 CERVICAL SPONDYLOSIS WITHOUT MYELOPATHY: ICD-10-CM

## 2023-10-04 RX ORDER — NABUMETONE 500 MG/1
500 TABLET, FILM COATED ORAL 2 TIMES DAILY PRN
Qty: 60 TABLET | Refills: 5 | Status: SHIPPED | OUTPATIENT
Start: 2023-10-04

## 2023-10-04 RX ORDER — DIAZEPAM 10 MG/1
10 TABLET ORAL ONCE
Qty: 1 TABLET | Refills: 0 | Status: SHIPPED | OUTPATIENT
Start: 2023-10-04 | End: 2023-10-04

## 2023-10-04 RX ORDER — PREGABALIN 75 MG/1
75 CAPSULE ORAL 3 TIMES DAILY
Qty: 90 CAPSULE | Refills: 5 | Status: SHIPPED | OUTPATIENT
Start: 2023-10-04
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
## 2023-10-11 ENCOUNTER — OFFICE VISIT (OUTPATIENT)
Dept: ORTHOPEDIC SURGERY | Facility: CLINIC | Age: 38
End: 2023-10-11
Payer: MEDICAID

## 2023-10-11 VITALS — HEIGHT: 58 IN | BODY MASS INDEX: 38.62 KG/M2 | WEIGHT: 184 LBS

## 2023-10-11 DIAGNOSIS — M25.561 CHRONIC PAIN OF BOTH KNEES: Primary | ICD-10-CM

## 2023-10-11 DIAGNOSIS — M25.562 CHRONIC PAIN OF BOTH KNEES: Primary | ICD-10-CM

## 2023-10-11 DIAGNOSIS — M54.16 CHRONIC RADICULAR LUMBAR PAIN: ICD-10-CM

## 2023-10-11 DIAGNOSIS — G89.29 CHRONIC PAIN OF BOTH KNEES: Primary | ICD-10-CM

## 2023-10-11 DIAGNOSIS — G89.29 CHRONIC RADICULAR LUMBAR PAIN: ICD-10-CM

## 2023-10-11 PROCEDURE — 99213 OFFICE O/P EST LOW 20 MIN: CPT | Performed by: ORTHOPAEDIC SURGERY

## 2023-10-11 PROCEDURE — 1159F MED LIST DOCD IN RCRD: CPT | Performed by: ORTHOPAEDIC SURGERY

## 2023-10-11 PROCEDURE — 1160F RVW MEDS BY RX/DR IN RCRD: CPT | Performed by: ORTHOPAEDIC SURGERY

## 2023-10-11 NOTE — PROGRESS NOTES
"Chief Complaint  Follow-up of the Left Knee and Follow-up of the Right Knee    Subjective    History of Present Illness      Rand Schilling is a 38 y.o. female who presents to [facility] for lumbar spine pain and bilateral knee pain.  History of Present Illness   Pain Location:  BILATERAL knee  Radiation:  From the lumbar spine into the hip buttock and thigh  Quality: dull, aching  Intensity/Severity: moderate to severe  Duration:  several months  Progression of symptoms: no worsening, symptoms stable/unchanged  Onset quality: gradual   Timing: intermittent  Aggravating Factors: going up and down stairs, squatting  Alleviating Factors: NSAIDs  Previous Episodes: yes  Associated Symptoms: pain, swelling  ADLs Affected: work related activities, recreational activities/sports  Previous Treatment: NSAIDs       Objective   Vital Signs:   Ht 147.3 cm (58\")   Wt 83.5 kg (184 lb)   BMI 38.46 kg/m²     Physical Exam  Physical Exam  Vitals signs and nursing note reviewed.   Constitutional:       Appearance: Normal appearance.   Pulmonary:      Effort: Pulmonary effort is normal.   Skin:     General: Skin is warm and dry.      Capillary Refill: Capillary refill takes less than 2 seconds.   Neurological:      General: No focal deficit present.      Mental Status: He is alert and oriented to person, place, and time. Mental status is at baseline.   Psychiatric:         Mood and Affect: Mood normal.         Behavior: Behavior normal.         Thought Content: Thought content normal.         Judgment: Judgment normal.     Ortho Exam   Bilateral knee (cmp). The patients' patellar grind test is exquisitely postive.  A lot of pain and discomfort in the retropatellar area. The patient has high Q-angle. Range of motion is from 0-120 degrees of flexion. Anterior and posterior drawer signs are negative. No medial or lateral instability is noted. The patella tends to track laterally in high grades of flexion. " A Slightly positive apprehension sign is noted. There is some tenderness over the medial patellofemoral ligaments. Skin and soft tissues are swollen; consistent with inflammatory changes of the patellofemoral joint. Dorsalis pedis and posterior tibial artery pulses are palpable. Common peroneal nerve function is well preserved. Quad mechanism is well preserved.    Bilateral SI JOINT: Mild tenderness is present dorsally over the SI joint. Figure of 4 sign is positive. There is mild spasm present in the erector spinae muscle. Flexion and extension are associated with discomfort. Deep tendon reflexes are intact and symmetrical on both lower extremities. Pain radiates into the buttock on extension of the hip. No evidence of cauda equina syndrome. No motor or sensory deficit is noted. There is no bowel or bladder involvement.            Result Review :  The following data was reviewed by: Chriss Shaw MD on 10/11/2023:                Procedures           Assessment   Assessment and Plan    Diagnoses and all orders for this visit:    1. Chronic pain of both knees (Primary)    2. Chronic radicular lumbar pain          Follow Up   Compression/brace to the knee to prevent the knee from buckling and giving out and to allow the patella to track appropriately.  Vitamin B6, B12 100 mcg tab 1 p.o. daily for the burning sensation in the lower extremities.  Calcium and vitamin D for bone health.  She is relatively young and therefore we do not recommend steroid injections to the knee for an ongoing patellar chondromalacia issue.  Rest, ice, compression, and elevation (RICE) therapy  Stretching and strengthening exercises of the quads and hamstrings.  Glucosamine, chondroitin and turmeric for cartilage health.  OTC Tylenol 500-1000mg by mouth every 6 hours as needed for pain   Follow up in 6 month(s)  Patient was given instructions and counseling regarding her condition or for health maintenance advice. Please see specific  information pulled into the AVS if appropriate.     Chriss Shaw MD   Date of Encounter: 10/11/2023     EMR Dragon/Transcription disclaimer:  Much of this encounter note is an electronic transcription/translation of spoken language to printed text. The electronic translation of spoken language may permit erroneous, or at times, nonsensical words or phrases to be inadvertently transcribed; Although I have reviewed the note for such errors, some may still exist.

## 2023-10-19 ENCOUNTER — HOSPITAL ENCOUNTER (OUTPATIENT)
Dept: PAIN MEDICINE | Facility: HOSPITAL | Age: 38
Discharge: HOME | End: 2023-10-19
Payer: MEDICAID

## 2023-10-19 ENCOUNTER — APPOINTMENT (OUTPATIENT)
Dept: PULMONOLOGY | Facility: HOSPITAL | Age: 38
End: 2023-10-19
Payer: MEDICAID

## 2023-10-19 VITALS
SYSTOLIC BLOOD PRESSURE: 93 MMHG | DIASTOLIC BLOOD PRESSURE: 60 MMHG | TEMPERATURE: 98 F | WEIGHT: 184 LBS | OXYGEN SATURATION: 99 % | HEIGHT: 58 IN | RESPIRATION RATE: 16 BRPM | HEART RATE: 68 BPM | BODY MASS INDEX: 38.62 KG/M2

## 2023-10-19 DIAGNOSIS — G89.29 CHRONIC PAIN OF BOTH KNEES: ICD-10-CM

## 2023-10-19 DIAGNOSIS — R52 PAIN: ICD-10-CM

## 2023-10-19 DIAGNOSIS — M25.561 CHRONIC PAIN OF BOTH KNEES: ICD-10-CM

## 2023-10-19 DIAGNOSIS — M54.16 LUMBAR RADICULITIS: Primary | ICD-10-CM

## 2023-10-19 DIAGNOSIS — M25.562 CHRONIC PAIN OF BOTH KNEES: ICD-10-CM

## 2023-10-19 PROCEDURE — 77003 FLUOROGUIDE FOR SPINE INJECT: CPT

## 2023-10-19 PROCEDURE — 25510000001 IOPAMIDOL 41 % SOLUTION: Performed by: ANESTHESIOLOGY

## 2023-10-19 PROCEDURE — 25010000002 METHYLPREDNISOLONE PER 40 MG: Performed by: ANESTHESIOLOGY

## 2023-10-19 RX ORDER — METHYLPREDNISOLONE ACETATE 40 MG/ML
40 INJECTION, SUSPENSION INTRA-ARTICULAR; INTRALESIONAL; INTRAMUSCULAR; SOFT TISSUE ONCE
Status: COMPLETED | OUTPATIENT
Start: 2023-10-19 | End: 2023-10-19

## 2023-10-19 RX ORDER — IOPAMIDOL 408 MG/ML
3 INJECTION, SOLUTION INTRATHECAL
Status: COMPLETED | OUTPATIENT
Start: 2023-10-19 | End: 2023-10-19

## 2023-10-19 RX ADMIN — IOPAMIDOL 3 ML: 408 INJECTION, SOLUTION INTRATHECAL at 13:44

## 2023-10-19 RX ADMIN — METHYLPREDNISOLONE ACETATE 40 MG: 40 INJECTION, SUSPENSION INTRA-ARTICULAR; INTRALESIONAL; INTRAMUSCULAR; INTRASYNOVIAL; SOFT TISSUE at 13:45

## 2023-10-19 NOTE — DISCHARGE INSTRUCTIONS

## 2023-10-19 NOTE — PROCEDURES
"Subjective   CC back pain, left leg pain  Rand Schilling is a 38 y.o. female with lumbar radiculitis here for repeat LESI..     No anticoagulation    Pain Assessment   Location of Pain: Lower Back, R Hip, L Hip, L Leg,  Description of Pain: Dull/Aching, Throbbing, Stabbing  Previous Pain Rating :  Current Pain Rating:   Aggravating Factors: Activity  Alleviating Factors: Rest, Medication  Pain onset over 12 weeks  Pain interferes with ADL's    The following portions of the patient's history were reviewed and updated as appropriate: allergies, current medications, past family history, past medical history, past social history, past surgical history and problem list.      Review of Systems  As in HPI  Objective   Physical Exam  Vitals reviewed.   Constitutional:       General: She is not in acute distress.  Pulmonary:      Effort: Pulmonary effort is normal.       BP 98/65 (BP Location: Right arm, Patient Position: Sitting)   Pulse 80   Temp 98 °F (36.7 °C) (Oral)   Resp 16   Ht 147.3 cm (58\")   Wt 83.5 kg (184 lb)   SpO2 99%   BMI 38.46 kg/m²     Assessment & Plan    underwent repeat LESI    RTC 4-6 weeks or as needed for repeat    DATE OF PROCEDURE: 10/19/2023    PREOPERATIVE DIAGNOSIS: lumbar DDD/radiculitis    POSTOPERATIVE DIAGNOSIS: same    PROCEDURE PERFORMED:  lumbar Epidural Steroid Injection    The patient presents with a history of   lumbar degenerative disc disease with  radiculitis. The patient presents today for a  lumbar epidural steroid injection at level  L5/S1.   The patient was seen in the preoperative area.  Patient's consent was obtained and updated.  Vitals were taken.  Patient was then brought to the procedure suite and placed in a prone position. The appropriate anatomic area was widely prepped with Chloraprep and draped in a sterile fashion. Noninvasive monitoring per routine anesthesia protocol was placed.  Under fluoroscopic guidance using  AP view a 20 gauge styleted tuohy needle was " passed through skin anesthetized with 1% Lidocaine without epinephrine.  The needle was advanced using the continuous loss of resistance to saline technique into the L5 epidural space. Needle tip placement in the epidural space was confirmed by loss of resistance and injection of 1 mL of  preservative free contrast.  Following this 8 mL of a solution containing  1 mL of 40 mg Depo-Medrol and 7 mL of preservative-free saline was carefully administered in the epidural space.  A sterile dressing was placed over the puncture site.    The patient tolerated the procedure with no complications . They were then brought to the post procedure area where they recovered nicely.

## 2023-10-20 ENCOUNTER — TELEPHONE (OUTPATIENT)
Dept: PAIN MEDICINE | Facility: HOSPITAL | Age: 38
End: 2023-10-20
Payer: MEDICAID

## 2023-10-20 NOTE — TELEPHONE ENCOUNTER
Post procedure phone call completed.  Pt states they are doing good and denies questions or concerns. Pain #6

## 2023-10-26 PROBLEM — M54.16 CHRONIC RADICULAR LUMBAR PAIN: Status: ACTIVE | Noted: 2023-10-26

## 2023-10-26 PROBLEM — G89.29 CHRONIC RADICULAR LUMBAR PAIN: Status: ACTIVE | Noted: 2023-10-26

## 2024-03-05 ENCOUNTER — OFFICE VISIT (OUTPATIENT)
Dept: PAIN MEDICINE | Facility: CLINIC | Age: 39
End: 2024-03-05
Payer: MEDICAID

## 2024-03-05 VITALS
OXYGEN SATURATION: 98 % | RESPIRATION RATE: 16 BRPM | HEART RATE: 65 BPM | WEIGHT: 190 LBS | BODY MASS INDEX: 39.71 KG/M2 | SYSTOLIC BLOOD PRESSURE: 104 MMHG | DIASTOLIC BLOOD PRESSURE: 56 MMHG

## 2024-03-05 DIAGNOSIS — M25.561 CHRONIC PAIN OF BOTH KNEES: ICD-10-CM

## 2024-03-05 DIAGNOSIS — M47.812 CERVICAL SPONDYLOSIS WITHOUT MYELOPATHY: ICD-10-CM

## 2024-03-05 DIAGNOSIS — G89.4 CHRONIC PAIN SYNDROME: Primary | ICD-10-CM

## 2024-03-05 DIAGNOSIS — G89.29 CHRONIC PAIN OF BOTH KNEES: ICD-10-CM

## 2024-03-05 DIAGNOSIS — M79.7 FIBROMYALGIA: ICD-10-CM

## 2024-03-05 DIAGNOSIS — M25.562 CHRONIC PAIN OF BOTH KNEES: ICD-10-CM

## 2024-03-05 DIAGNOSIS — M47.817 LUMBOSACRAL SPONDYLOSIS WITHOUT MYELOPATHY: ICD-10-CM

## 2024-03-05 DIAGNOSIS — M25.50 POLYARTHRALGIA: ICD-10-CM

## 2024-03-05 RX ORDER — NABUMETONE 500 MG/1
500 TABLET, FILM COATED ORAL 2 TIMES DAILY PRN
Qty: 60 TABLET | Refills: 5 | Status: SHIPPED | OUTPATIENT
Start: 2024-03-05

## 2024-03-05 RX ORDER — PREDNISOLONE ACETATE 10 MG/ML
1 SUSPENSION/ DROPS OPHTHALMIC 4 TIMES DAILY
COMMUNITY

## 2024-03-05 RX ORDER — PREGABALIN 75 MG/1
75 CAPSULE ORAL 3 TIMES DAILY
Qty: 90 CAPSULE | Refills: 5 | Status: SHIPPED | OUTPATIENT
Start: 2024-03-05
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
## 2024-05-13 RX ORDER — PERMETHRIN 0.25 %
SPRAY, NON-AEROSOL (ML) MISCELLANEOUS ONCE
Qty: 120 ML | Refills: 0 | Status: SHIPPED | OUTPATIENT
Start: 2024-05-13 | End: 2024-05-13

## 2024-05-14 RX ORDER — SPINOSAD 9 MG/ML
1 SUSPENSION TOPICAL ONCE
Qty: 120 ML | Refills: 0 | Status: SHIPPED | OUTPATIENT
Start: 2024-05-14 | End: 2024-05-14

## 2024-06-27 RX ORDER — ALBUTEROL SULFATE 0.63 MG/3ML
SOLUTION RESPIRATORY (INHALATION)
Qty: 375 ML | Refills: 12 | Status: SHIPPED | OUTPATIENT
Start: 2024-06-27

## 2024-07-23 RX ORDER — BUDESONIDE 0.5 MG/2ML
0.5 INHALANT ORAL
Qty: 60 EACH | Refills: 0 | Status: SHIPPED | OUTPATIENT
Start: 2024-07-23

## 2024-09-02 RX ORDER — SPINOSAD 9 MG/ML
SUSPENSION TOPICAL
Qty: 120 ML | Refills: 1 | Status: SHIPPED | OUTPATIENT
Start: 2024-09-02

## 2024-10-08 ENCOUNTER — TELEPHONE (OUTPATIENT)
Dept: PULMONOLOGY | Facility: HOSPITAL | Age: 39
End: 2024-10-08
Payer: MEDICAID

## 2024-10-08 NOTE — TELEPHONE ENCOUNTER
Sonidov'd fax from [facility] 10/7/24  Pt having CTR 10/8/24 and are requesting pulmonary clearance.  Per Dr. Cagle schedule pt for f/u 10/9/24.   Kathia called pt and she stated that she already had the surgery today 10/8/24.

## 2025-03-11 ENCOUNTER — LAB (OUTPATIENT)
Dept: FAMILY MEDICINE CLINIC | Facility: CLINIC | Age: 40
End: 2025-03-11
Payer: MEDICAID

## 2025-03-11 ENCOUNTER — OFFICE VISIT (OUTPATIENT)
Dept: FAMILY MEDICINE CLINIC | Facility: CLINIC | Age: 40
End: 2025-03-11
Payer: MEDICAID

## 2025-03-11 ENCOUNTER — TELEPHONE (OUTPATIENT)
Dept: FAMILY MEDICINE CLINIC | Facility: CLINIC | Age: 40
End: 2025-03-11

## 2025-03-11 VITALS
WEIGHT: 192 LBS | BODY MASS INDEX: 37.69 KG/M2 | HEART RATE: 76 BPM | OXYGEN SATURATION: 99 % | SYSTOLIC BLOOD PRESSURE: 118 MMHG | TEMPERATURE: 97.8 F | HEIGHT: 60 IN | DIASTOLIC BLOOD PRESSURE: 84 MMHG

## 2025-03-11 DIAGNOSIS — Z23 NEED FOR TDAP VACCINATION: ICD-10-CM

## 2025-03-11 DIAGNOSIS — M79.7 FIBROMYALGIA: ICD-10-CM

## 2025-03-11 DIAGNOSIS — Z00.00 PREVENTATIVE HEALTH CARE: ICD-10-CM

## 2025-03-11 DIAGNOSIS — M19.90 ARTHRITIS: ICD-10-CM

## 2025-03-11 DIAGNOSIS — Z00.00 PREVENTATIVE HEALTH CARE: Primary | ICD-10-CM

## 2025-03-11 LAB
25(OH)D3 SERPL-MCNC: 19.6 NG/ML (ref 30–100)
ALBUMIN SERPL-MCNC: 4.2 G/DL (ref 3.5–5.2)
ALBUMIN/GLOB SERPL: 1.3 G/DL
ALP SERPL-CCNC: 105 U/L (ref 39–117)
ALT SERPL W P-5'-P-CCNC: 24 U/L (ref 1–33)
ANION GAP SERPL CALCULATED.3IONS-SCNC: 10 MMOL/L (ref 5–15)
AST SERPL-CCNC: 19 U/L (ref 1–32)
BASOPHILS # BLD AUTO: 0.06 10*3/MM3 (ref 0–0.2)
BASOPHILS NFR BLD AUTO: 0.7 % (ref 0–1.5)
BILIRUB SERPL-MCNC: 0.2 MG/DL (ref 0–1.2)
BUN SERPL-MCNC: 9 MG/DL (ref 6–20)
BUN/CREAT SERPL: 11.1 (ref 7–25)
CALCIUM SPEC-SCNC: 9.3 MG/DL (ref 8.6–10.5)
CHLORIDE SERPL-SCNC: 102 MMOL/L (ref 98–107)
CHOLEST SERPL-MCNC: 215 MG/DL (ref 0–200)
CO2 SERPL-SCNC: 26 MMOL/L (ref 22–29)
CREAT SERPL-MCNC: 0.81 MG/DL (ref 0.57–1)
DEPRECATED RDW RBC AUTO: 44.6 FL (ref 37–54)
EGFRCR SERPLBLD CKD-EPI 2021: 95.4 ML/MIN/1.73
EOSINOPHIL # BLD AUTO: 0.14 10*3/MM3 (ref 0–0.4)
EOSINOPHIL NFR BLD AUTO: 1.5 % (ref 0.3–6.2)
ERYTHROCYTE [DISTWIDTH] IN BLOOD BY AUTOMATED COUNT: 12.6 % (ref 12.3–15.4)
GLOBULIN UR ELPH-MCNC: 3.2 GM/DL
GLUCOSE SERPL-MCNC: 79 MG/DL (ref 65–99)
HBA1C MFR BLD: 5.5 % (ref 4.8–5.6)
HCT VFR BLD AUTO: 45 % (ref 34–46.6)
HDLC SERPL-MCNC: 46 MG/DL (ref 40–60)
HGB BLD-MCNC: 15.5 G/DL (ref 12–15.9)
IMM GRANULOCYTES # BLD AUTO: 0.03 10*3/MM3 (ref 0–0.05)
IMM GRANULOCYTES NFR BLD AUTO: 0.3 % (ref 0–0.5)
LDLC SERPL CALC-MCNC: 139 MG/DL (ref 0–100)
LDLC/HDLC SERPL: 2.94 {RATIO}
LYMPHOCYTES # BLD AUTO: 2.95 10*3/MM3 (ref 0.7–3.1)
LYMPHOCYTES NFR BLD AUTO: 32.2 % (ref 19.6–45.3)
MCH RBC QN AUTO: 33.3 PG (ref 26.6–33)
MCHC RBC AUTO-ENTMCNC: 34.4 G/DL (ref 31.5–35.7)
MCV RBC AUTO: 96.6 FL (ref 79–97)
MONOCYTES # BLD AUTO: 0.67 10*3/MM3 (ref 0.1–0.9)
MONOCYTES NFR BLD AUTO: 7.3 % (ref 5–12)
NEUTROPHILS NFR BLD AUTO: 5.3 10*3/MM3 (ref 1.7–7)
NEUTROPHILS NFR BLD AUTO: 58 % (ref 42.7–76)
NRBC BLD AUTO-RTO: 0 /100 WBC (ref 0–0.2)
PLATELET # BLD AUTO: 189 10*3/MM3 (ref 140–450)
PMV BLD AUTO: 12.1 FL (ref 6–12)
POTASSIUM SERPL-SCNC: 4.4 MMOL/L (ref 3.5–5.2)
PROT SERPL-MCNC: 7.4 G/DL (ref 6–8.5)
RBC # BLD AUTO: 4.66 10*6/MM3 (ref 3.77–5.28)
SODIUM SERPL-SCNC: 138 MMOL/L (ref 136–145)
T3FREE SERPL-MCNC: 3.11 PG/ML (ref 2–4.4)
T4 FREE SERPL-MCNC: 0.98 NG/DL (ref 0.92–1.68)
TRIGL SERPL-MCNC: 168 MG/DL (ref 0–150)
TSH SERPL DL<=0.05 MIU/L-ACNC: 1.57 UIU/ML (ref 0.27–4.2)
VLDLC SERPL-MCNC: 30 MG/DL (ref 5–40)
WBC NRBC COR # BLD AUTO: 9.15 10*3/MM3 (ref 3.4–10.8)

## 2025-03-11 PROCEDURE — 86735 MUMPS ANTIBODY: CPT | Performed by: NURSE PRACTITIONER

## 2025-03-11 PROCEDURE — 80061 LIPID PANEL: CPT | Performed by: NURSE PRACTITIONER

## 2025-03-11 PROCEDURE — 84439 ASSAY OF FREE THYROXINE: CPT | Performed by: NURSE PRACTITIONER

## 2025-03-11 PROCEDURE — 83036 HEMOGLOBIN GLYCOSYLATED A1C: CPT | Performed by: NURSE PRACTITIONER

## 2025-03-11 PROCEDURE — 82306 VITAMIN D 25 HYDROXY: CPT | Performed by: NURSE PRACTITIONER

## 2025-03-11 PROCEDURE — 36415 COLL VENOUS BLD VENIPUNCTURE: CPT | Performed by: NURSE PRACTITIONER

## 2025-03-11 PROCEDURE — 84481 FREE ASSAY (FT-3): CPT | Performed by: NURSE PRACTITIONER

## 2025-03-11 PROCEDURE — 86762 RUBELLA ANTIBODY: CPT | Performed by: NURSE PRACTITIONER

## 2025-03-11 PROCEDURE — 80050 GENERAL HEALTH PANEL: CPT | Performed by: NURSE PRACTITIONER

## 2025-03-11 PROCEDURE — 86765 RUBEOLA ANTIBODY: CPT | Performed by: NURSE PRACTITIONER

## 2025-03-11 RX ORDER — BUSPIRONE HYDROCHLORIDE 10 MG/1
10 TABLET ORAL 2 TIMES DAILY
Qty: 180 TABLET | Refills: 1 | Status: SHIPPED | OUTPATIENT
Start: 2025-03-11

## 2025-03-11 RX ORDER — DULOXETIN HYDROCHLORIDE 60 MG/1
60 CAPSULE, DELAYED RELEASE ORAL DAILY
Qty: 30 CAPSULE | Refills: 0 | Status: SHIPPED | OUTPATIENT
Start: 2025-03-11

## 2025-03-11 RX ORDER — BUDESONIDE 0.5 MG/2ML
0.5 INHALANT ORAL
Qty: 60 EACH | Refills: 0 | Status: SHIPPED | OUTPATIENT
Start: 2025-03-11

## 2025-03-11 RX ORDER — ALBUTEROL SULFATE 0.63 MG/3ML
1 SOLUTION RESPIRATORY (INHALATION) EVERY 4 HOURS PRN
Qty: 375 ML | Refills: 12 | Status: SHIPPED | OUTPATIENT
Start: 2025-03-11

## 2025-03-11 NOTE — TELEPHONE ENCOUNTER
Caller: Rand Schilling    Relationship: Self    Best call back number:         What was the call regarding: PATIENT HAD LABS DONE AT HER APPT TODAY, BUT IS STILL SHOWING ON THE SCHEDULE         Is it okay if the provider responds through MyChart:

## 2025-03-11 NOTE — PROGRESS NOTES
Chief Complaint  Annual Exam    Subjective        Rand Schilling presents to [facility]  History of Present Illness  Rand is a 39-year-old female presenting today for her annual physical. She has multiple concerns she wants to discuss today. She says at the end of July she stopped taking care of herself. She was dealing a lot of health issue with her mom. She recently found out there are a lot of heart issues in her family. She has been extremely depressed. She has been out of her meds since October. Lately she has been sleeping more than usual. She would like to be restarted on Cymbalta. She has been in a lot of pain lately. She sees pain management who wanted to send her to rheumatology. [facility] rheumatology did not take her insurance, but she has found that [facility] will, so we will refer her today.             Pap Smear: over a year ago. normal  Diet: not balanced  Flu Vaccine: declined  Covid Vaccine: denies  Tdap: will update today    Objective     The following portions of the patient's history were reviewed and updated as appropriate: allergies, current medications, past family history, past medical history, past social history, past surgical history and problem list.    Allergies   Allergen Reactions    Penicillins Anaphylaxis, Hives and Swelling         Current Outpatient Medications:     albuterol (ACCUNEB) 0.63 MG/3ML nebulizer solution, Take 3 mL by nebulization Every 4 (Four) Hours As Needed for Wheezing., Disp: 375 mL, Rfl: 12    budesonide (PULMICORT) 0.5 MG/2ML nebulizer solution, Take 2 mL by nebulization Daily. J44.9 DUE FOR APPT  Indications: Chronic Obstructive Lung Disease, Disp: 60 each, Rfl: 0    busPIRone (BUSPAR) 10 MG tablet, Take 1 tablet by mouth 2 (Two) Times a Day., Disp: 180 tablet, Rfl: 1    DULoxetine (CYMBALTA) 60 MG capsule, Take 1 capsule by mouth Daily., Disp: 30 capsule, Rfl: 0    albuterol sulfate  (90 Base) MCG/ACT inhaler, Inhale 2  puffs Every 6 (Six) Hours As Needed for Wheezing or Shortness of Air., Disp: 18 g, Rfl: 5    artificial tears (REFRESH LACRI-LUBE) ointment ophthalmic ointment, Administer  to both eyes Every 1 (One) Hour As Needed (dry eye)., Disp: 7 g, Rfl: 0    Cranberry 500 MG capsule, Take  by mouth., Disp: , Rfl:     famotidine (PEPCID) 40 MG tablet, Take 1 tablet by mouth every night at bedtime., Disp: , Rfl:     nabumetone (RELAFEN) 500 MG tablet, Take 1 tablet by mouth 2 (Two) Times a Day As Needed for Mild Pain., Disp: 60 tablet, Rfl: 5    NON FORMULARY, Delta 8, Disp: , Rfl:     pantoprazole (PROTONIX) 40 MG EC tablet, Take 1 tablet by mouth Daily., Disp: , Rfl:     prednisoLONE acetate (PRED FORTE) 1 % ophthalmic suspension, 1 drop 4 (Four) Times a Day., Disp: , Rfl:     pregabalin (LYRICA) 75 MG capsule, Take 1 capsule by mouth 3 (Three) Times a Day., Disp: 90 capsule, Rfl: 5    Quercetin 500 MG capsule, Take  by mouth., Disp: , Rfl:     senna (SENOKOT) 8.6 MG tablet, Take 1 tablet by mouth 2 (Two) Times a Day. (takes 2 at night  Together), Disp: , Rfl:     sennosides-docusate (PERICOLACE) 8.6-50 MG per tablet, Take 2 tablets by mouth Every Night., Disp: , Rfl:     Spacer/Aero-Holding Chambers (Vortex Valved Holding Chamber) device, Use spacer device with each use of Symbicort and any other metered-dose inhaler, Disp: 1 each, Rfl: 3    Spinosad (Natroba) 0.9 % suspension, Apply fully to dry scalp, repeat in 7 days if live lice still found, Disp: 120 mL, Rfl: 1    spironolactone (ALDACTONE) 50 MG tablet, Take 2 tablets by mouth 2 (two) times a day., Disp: , Rfl:     valACYclovir (VALTREX) 500 MG tablet, , Disp: , Rfl:     Zinc 50 MG tablet, Take 1 tablet by mouth Daily., Disp: , Rfl:     Family History   Problem Relation Age of Onset    Stroke Mother     Heart attack Mother     No Known Problems Father     Heart attack Maternal Uncle     Heart attack Maternal Grandfather         Past Medical History:   Diagnosis Date     Anxiety     Arthritis     Bell's palsy     5/2023    Cancer     cervical 2003    COPD (chronic obstructive pulmonary disease)     Depression     Enlarged liver     Genital herpes     GERD (gastroesophageal reflux disease)     Granulomatosis     High cholesterol     Joint pain     Knee pain     Leg pain     left-- with sharp pains and numbness    Low back pain     Lung nodules     MRSA infection     2010     Neck pain     Numbness and tingling of both feet     IDA on CPAP 08/17/2023    CPAP Therapy        Social History     Socioeconomic History    Marital status: Single   Tobacco Use    Smoking status: Every Day     Current packs/day: 1.00     Types: Cigarettes     Passive exposure: Current    Smokeless tobacco: Never   Vaping Use    Vaping status: Some Days    Devices: Disposable   Substance and Sexual Activity    Alcohol use: Not Currently    Drug use: Defer     Comment: 1/24/22 uses delta 8- pt stated for pain    Sexual activity: Defer        Past Surgical History:   Procedure Laterality Date    BREAST SURGERY      rt-- mRSA infection    COLONOSCOPY N/A 1/21/2022    Procedure: COLONOSCOPY with large intestine tissue biopsy, rectal polyps, internal hemorrhoids;  Surgeon: Alberto Pearson MD;  Location: [facility];  Service: Gastroenterology;  Laterality: N/A;  post op: random large intestine tissue biopsy, rectum polyp x5    ENDOSCOPY      FINGER FRACTURE SURGERY      rt little  finger    HYSTERECTOMY      OTHER SURGICAL HISTORY      leep procedure- removed cancer cells -- 2003    TUBAL ABDOMINAL LIGATION          Patient Active Problem List   Diagnosis    ESBL (extended spectrum beta-lactamase) producing bacteria infection    Left knee pain    Injury of right knee    Granulomatous disease    Arthritis    Interstitial cystitis    Colon polyps    Pain in both knees    Hand arthritis    Facial paralysis/[place] palsy    IDA on CPAP    Chronic radicular lumbar pain       Immunization History   Administered  "Date(s) Administered    DTP 1986, 1986, 1986    DTaP 12/07/1987, 07/15/1991    H1N1 Inj 11/12/2009    Hep B, Adolescent or Pediatric 11/13/2001    Hepatitis B Adult/Adolescent IM 02/08/2006    Hib (PRP-T) 12/10/1990    MMR 12/07/1987, 05/01/1997    OPV 1986, 1986, 12/07/1987, 07/15/1991    Td (TDVAX) 06/15/2000    Tdap 03/11/2025          Vital Signs:  /84 (BP Location: Right arm, Patient Position: Sitting, Cuff Size: Adult)   Pulse 76   Temp 97.8 °F (36.6 °C) (Tympanic)   Ht 152.4 cm (60\")   Wt 87.1 kg (192 lb)   SpO2 99%   BMI 37.50 kg/m²   Estimated body mass index is 37.5 kg/m² as calculated from the following:    Height as of this encounter: 152.4 cm (60\").    Weight as of this encounter: 87.1 kg (192 lb).           Review of Systems   Constitutional:  Negative for activity change, appetite change, chills, fatigue, fever and unexpected weight change.   HENT:  Negative for congestion, rhinorrhea, sneezing and sore throat.    Eyes:  Negative for visual disturbance.   Respiratory:  Negative for cough, shortness of breath and wheezing.    Cardiovascular:  Negative for chest pain.   Gastrointestinal:  Negative for abdominal pain, constipation, diarrhea, nausea and vomiting.   Genitourinary:  Negative for difficulty urinating and dysuria.   Musculoskeletal:  Positive for arthralgias. Negative for gait problem and myalgias.   Skin:  Negative for color change, rash and wound.   Neurological:  Negative for dizziness, weakness, light-headedness, numbness and headaches.   Psychiatric/Behavioral:  Negative for self-injury, sleep disturbance and suicidal ideas. The patient is not nervous/anxious.       Physical Exam  Constitutional:       Appearance: Normal appearance.   HENT:      Head: Normocephalic and atraumatic.      Right Ear: Tympanic membrane, ear canal and external ear normal.      Left Ear: Tympanic membrane, ear canal and external ear normal.      Nose: Nose normal.      " Mouth/Throat:      Mouth: Mucous membranes are moist.      Pharynx: Oropharynx is clear.   Eyes:      Extraocular Movements: Extraocular movements intact.      Conjunctiva/sclera: Conjunctivae normal.      Pupils: Pupils are equal, round, and reactive to light.   Cardiovascular:      Rate and Rhythm: Normal rate and regular rhythm.      Pulses: Normal pulses.      Heart sounds: Normal heart sounds.   Pulmonary:      Effort: Pulmonary effort is normal.      Breath sounds: Normal breath sounds.   Abdominal:      General: Abdomen is flat. Bowel sounds are normal.      Palpations: Abdomen is soft.   Musculoskeletal:         General: Normal range of motion.      Cervical back: Normal range of motion and neck supple.   Skin:     General: Skin is warm and dry.      Capillary Refill: Capillary refill takes less than 2 seconds.   Neurological:      Mental Status: She is alert and oriented to person, place, and time.   Psychiatric:         Mood and Affect: Mood normal.         Behavior: Behavior normal.         Thought Content: Thought content normal.         Judgment: Judgment normal.        Result Review :                   Assessment and Plan   Diagnoses and all orders for this visit:    1. Preventative health care (Primary)  Comments:  labs ordered today  tdap updated  patient requested mmr immunity test  Orders:  -     Comprehensive Metabolic Panel  -     Hemoglobin A1c  -     Lipid Panel  -     T4, Free  -     TSH  -     Vitamin D,25-Hydroxy  -     CBC & Differential  -     T3, Free  -     Measles / Mumps / Rubella Immunity; Future    2. Fibromyalgia  Comments:  referral to rheumatology for arthritis and fibromyalgia.  Orders:  -     Ambulatory Referral to Rheumatology    3. Arthritis  -     Ambulatory Referral to Rheumatology    4. Need for Tdap vaccination  -     Tdap Vaccine => 6yo IM (BOOSTRIX/ADACEL)    Other orders  -     DULoxetine (CYMBALTA) 60 MG capsule; Take 1 capsule by mouth Daily.  Dispense: 30 capsule;  Refill: 0  -     budesonide (PULMICORT) 0.5 MG/2ML nebulizer solution; Take 2 mL by nebulization Daily. J44.9 DUE FOR APPT  Indications: Chronic Obstructive Lung Disease  Dispense: 60 each; Refill: 0  -     albuterol (ACCUNEB) 0.63 MG/3ML nebulizer solution; Take 3 mL by nebulization Every 4 (Four) Hours As Needed for Wheezing.  Dispense: 375 mL; Refill: 12  -     busPIRone (BUSPAR) 10 MG tablet; Take 1 tablet by mouth 2 (Two) Times a Day.  Dispense: 180 tablet; Refill: 1             Follow Up   Return in about 6 months (around 9/11/2025) for Recheck.  Patient was given instructions and counseling regarding her condition or for health maintenance advice. Please see specific information pulled into the AVS if appropriate.

## 2025-03-12 LAB
MEV IGG SER IA-ACNC: >300 AU/ML
MUV IGG SER IA-ACNC: 22.9 AU/ML
RUBV IGG SERPL IA-ACNC: 1.78 INDEX

## 2025-03-13 ENCOUNTER — TELEPHONE (OUTPATIENT)
Dept: FAMILY MEDICINE CLINIC | Facility: CLINIC | Age: 40
End: 2025-03-13
Payer: MEDICAID

## 2025-03-13 ENCOUNTER — OFFICE (AMBULATORY)
Dept: URBAN - METROPOLITAN AREA CLINIC 64 | Facility: CLINIC | Age: 40
End: 2025-03-13
Payer: COMMERCIAL

## 2025-03-13 VITALS
SYSTOLIC BLOOD PRESSURE: 116 MMHG | SYSTOLIC BLOOD PRESSURE: 140 MMHG | WEIGHT: 192 LBS | HEART RATE: 65 BPM | HEIGHT: 60 IN | DIASTOLIC BLOOD PRESSURE: 79 MMHG | DIASTOLIC BLOOD PRESSURE: 85 MMHG

## 2025-03-13 DIAGNOSIS — K21.00 GASTRO-ESOPHAGEAL REFLUX DISEASE WITH ESOPHAGITIS, WITHOUT B: ICD-10-CM

## 2025-03-13 DIAGNOSIS — K59.04 CHRONIC IDIOPATHIC CONSTIPATION: ICD-10-CM

## 2025-03-13 DIAGNOSIS — Z86.0101 PERSONAL HISTORY OF ADENOMATOUS AND SERRATED COLON POLYPS: ICD-10-CM

## 2025-03-13 PROCEDURE — 99214 OFFICE O/P EST MOD 30 MIN: CPT | Performed by: NURSE PRACTITIONER

## 2025-03-13 RX ORDER — PANTOPRAZOLE 40 MG/1
TABLET, DELAYED RELEASE ORAL
Qty: 90 | Refills: 3 | Status: ACTIVE

## 2025-03-13 RX ORDER — LINACLOTIDE 145 UG/1
145 CAPSULE, GELATIN COATED ORAL
Qty: 30 | Refills: 3 | Status: ACTIVE
Start: 2025-03-13

## 2025-03-13 RX ORDER — FAMOTIDINE 40 MG/1
TABLET, FILM COATED ORAL
Qty: 90 | Refills: 3 | Status: ACTIVE

## 2025-03-13 NOTE — TELEPHONE ENCOUNTER
Prior Auth completed for Budesonide 0.5MG/2ML suspension via covermymeds. Pending determination within 72 hours minium.       (Key: [id number])  Rx #: [id number]  PA Case ID #: [id number]    [employer] Electronic PA Form (2017 NCPDP)

## 2025-03-20 ENCOUNTER — TELEPHONE (OUTPATIENT)
Dept: FAMILY MEDICINE CLINIC | Facility: CLINIC | Age: 40
End: 2025-03-20
Payer: MEDICAID

## 2025-03-20 RX ORDER — BUDESONIDE 0.5 MG/2ML
0.5 INHALANT ORAL
Qty: 60 EACH | Refills: 0 | Status: SHIPPED | OUTPATIENT
Start: 2025-03-20 | End: 2025-03-24

## 2025-03-24 DIAGNOSIS — J44.9 CHRONIC OBSTRUCTIVE PULMONARY DISEASE, UNSPECIFIED COPD TYPE: Primary | ICD-10-CM

## 2025-04-17 ENCOUNTER — OFFICE VISIT (OUTPATIENT)
Dept: PULMONOLOGY | Facility: HOSPITAL | Age: 40
End: 2025-04-17
Payer: MEDICAID

## 2025-04-17 VITALS
SYSTOLIC BLOOD PRESSURE: 126 MMHG | OXYGEN SATURATION: 99 % | BODY MASS INDEX: 37.69 KG/M2 | HEART RATE: 71 BPM | RESPIRATION RATE: 16 BRPM | HEIGHT: 60 IN | DIASTOLIC BLOOD PRESSURE: 76 MMHG | WEIGHT: 192 LBS

## 2025-04-17 DIAGNOSIS — R06.02 SHORTNESS OF BREATH ON EXERTION: ICD-10-CM

## 2025-04-17 DIAGNOSIS — J44.9 CHRONIC OBSTRUCTIVE PULMONARY DISEASE, UNSPECIFIED COPD TYPE: Primary | ICD-10-CM

## 2025-04-17 DIAGNOSIS — F17.210 CIGARETTE NICOTINE DEPENDENCE WITHOUT COMPLICATION: ICD-10-CM

## 2025-04-17 DIAGNOSIS — G47.33 OSA (OBSTRUCTIVE SLEEP APNEA): ICD-10-CM

## 2025-04-17 DIAGNOSIS — Z72.0 TOBACCO USE: ICD-10-CM

## 2025-04-17 PROCEDURE — G0463 HOSPITAL OUTPT CLINIC VISIT: HCPCS

## 2025-04-17 RX ORDER — IPRATROPIUM BROMIDE AND ALBUTEROL SULFATE 2.5; .5 MG/3ML; MG/3ML
3 SOLUTION RESPIRATORY (INHALATION) 4 TIMES DAILY PRN
Qty: 120 ML | Refills: 5 | Status: SHIPPED | OUTPATIENT
Start: 2025-04-17

## 2025-04-17 RX ORDER — BUDESONIDE AND FORMOTEROL FUMARATE DIHYDRATE 160; 4.5 UG/1; UG/1
2 AEROSOL RESPIRATORY (INHALATION) 2 TIMES DAILY
Qty: 1 EACH | Refills: 12 | Status: SHIPPED | OUTPATIENT
Start: 2025-04-17

## 2025-04-17 RX ORDER — CELECOXIB 200 MG/1
200 CAPSULE ORAL DAILY
COMMUNITY
Start: 2025-04-15

## 2025-04-17 RX ORDER — ALBUTEROL SULFATE 90 UG/1
2 INHALANT RESPIRATORY (INHALATION) EVERY 4 HOURS PRN
Qty: 18 G | Refills: 5 | Status: SHIPPED | OUTPATIENT
Start: 2025-04-17

## 2025-04-17 RX ORDER — NICOTINE 21-14-7MG
1 KIT TRANSDERMAL DAILY
Qty: 60 EACH | Refills: 1 | Status: SHIPPED | OUTPATIENT
Start: 2025-04-17 | End: 2025-04-21 | Stop reason: RX

## 2025-04-17 RX ORDER — DULOXETIN HYDROCHLORIDE 30 MG/1
30 CAPSULE, DELAYED RELEASE ORAL DAILY
COMMUNITY
Start: 2025-04-15

## 2025-04-17 RX ORDER — MIRABEGRON 25 MG/1
25 TABLET, FILM COATED, EXTENDED RELEASE ORAL DAILY
COMMUNITY
Start: 2025-04-15

## 2025-04-17 NOTE — PROGRESS NOTES
HPI:  Patient is here for follow-up on pulmonary and sleep disorder.  She could not tolerate the CPAP machine due to claustrophobia and panic attacks.  She reports chronic shortness of breath and cough.  Still smoking but would like to quit smoking.    Past Medical History:   Diagnosis Date    Anxiety     Arthritis     Bell's palsy     5/2023    Cancer     cervical 2003    COPD (chronic obstructive pulmonary disease)     Depression     Enlarged liver     Genital herpes     GERD (gastroesophageal reflux disease)     Granulomatosis     High cholesterol     Joint pain     Knee pain     Leg pain     left-- with sharp pains and numbness    Low back pain     Lung nodules     MRSA infection     2010     Neck pain     Numbness and tingling of both feet     IDA on CPAP 08/17/2023    CPAP Therapy        Current Outpatient Medications on File Prior to Visit   Medication Sig Dispense Refill    albuterol (ACCUNEB) 0.63 MG/3ML nebulizer solution Take 3 mL by nebulization Every 4 (Four) Hours As Needed for Wheezing. 375 mL 12    celecoxib (CeleBREX) 200 MG capsule Take 1 capsule by mouth Daily.      DULoxetine (CYMBALTA) 30 MG capsule Take 1 capsule by mouth Daily.      Mirabegron ER (MYRBETRIQ) 25 MG tablet sustained-release 24 hour 24 hr tablet Take 1 tablet by mouth Daily.      albuterol sulfate  (90 Base) MCG/ACT inhaler Inhale 2 puffs Every 6 (Six) Hours As Needed for Wheezing or Shortness of Air. (Patient not taking: Reported on 4/17/2025) 18 g 5    artificial tears (REFRESH LACRI-LUBE) ointment ophthalmic ointment Administer  to both eyes Every 1 (One) Hour As Needed (dry eye). 7 g 0    budesonide (PULMICORT) 90 MCG/ACT inhaler Inhale 1 puff 2 (Two) Times a Day. 1 each 11    busPIRone (BUSPAR) 10 MG tablet Take 1 tablet by mouth 2 (Two) Times a Day. 180 tablet 1    famotidine (PEPCID) 40 MG tablet Take 1 tablet by mouth every night at bedtime.      NON FORMULARY Delta 8      pantoprazole (PROTONIX) 40 MG EC tablet  Take 1 tablet by mouth Daily.      spironolactone (ALDACTONE) 50 MG tablet Take 2 tablets by mouth 2 (two) times a day.      valACYclovir (VALTREX) 500 MG tablet       Zinc 50 MG tablet Take 1 tablet by mouth Daily.      [DISCONTINUED] Cranberry 500 MG capsule Take  by mouth.      [DISCONTINUED] DULoxetine (CYMBALTA) 60 MG capsule Take 1 capsule by mouth Daily. 30 capsule 0    [DISCONTINUED] nabumetone (RELAFEN) 500 MG tablet Take 1 tablet by mouth 2 (Two) Times a Day As Needed for Mild Pain. 60 tablet 5    [DISCONTINUED] prednisoLONE acetate (PRED FORTE) 1 % ophthalmic suspension 1 drop 4 (Four) Times a Day.      [DISCONTINUED] pregabalin (LYRICA) 75 MG capsule Take 1 capsule by mouth 3 (Three) Times a Day. 90 capsule 5    [DISCONTINUED] Quercetin 500 MG capsule Take  by mouth.      [DISCONTINUED] senna (SENOKOT) 8.6 MG tablet Take 1 tablet by mouth 2 (Two) Times a Day. (takes 2 at night  Together)      [DISCONTINUED] sennosides-docusate (PERICOLACE) 8.6-50 MG per tablet Take 2 tablets by mouth Every Night.      [DISCONTINUED] Spacer/Aero-Holding Chambers (Vortex Valved Holding Chamber) device Use spacer device with each use of Symbicort and any other metered-dose inhaler 1 each 3    [DISCONTINUED] Spinosad (Natroba) 0.9 % suspension Apply fully to dry scalp, repeat in 7 days if live lice still found 120 mL 1     No current facility-administered medications on file prior to visit.        Social History     Tobacco Use    Smoking status: Every Day     Current packs/day: 1.00     Types: Cigarettes     Passive exposure: Current    Smokeless tobacco: Never   Vaping Use    Vaping status: Some Days    Devices: Disposable   Substance Use Topics    Alcohol use: Not Currently    Drug use: Defer     Comment: 1/24/22 uses delta 8- pt stated for pain        Family History   Problem Relation Age of Onset    Stroke Mother     Heart attack Mother     No Known Problems Father     Heart attack Maternal Uncle     Heart attack  "Maternal Grandfather         Review of system:  Constitutional: Negative for chills, fever and malaise/fatigue.   HENT: Negative.    Eyes: Negative.    Cardiovascular: Negative.    Respiratory: cough and shortness of breath.    Skin: Negative.    Musculoskeletal: Chronic pain syndrome  Gastrointestinal: Negative.    Genitourinary: Negative.    Neurological: Negative.        Physical exam:  Height 152.4 cm (60\"), weight 87.1 kg (192 lb), not currently breastfeeding.    General Appearance:  Alert   HEENT:  Normocephalic, without obvious abnormality, Conjunctiva/corneas clear,.   Nares normal, no drainage     Neck:  Supple, symmetrical, trachea midline. No JVD.  Lungs /Chest wall:   good air entry Bilaterlly, respirations unlabored, symmetrical wall movement.     Heart:  Regular rate and rhythm, S1 S2 normal  Abdomen: Soft, non-tender, no masses, no organomegaly.    Extremities: No edema, no clubbing or cyanosis    No radiology results for the last 90 days.   Results for orders placed during the hospital encounter of 02/15/22    Adult Transthoracic Echo Complete w/ Color, Spectral and Contrast if necessary per protocol    Interpretation Summary  · Estimated left ventricular EF = 70% Estimated left ventricular EF was in agreement with the calculated left ventricular EF. Left ventricular systolic function is normal.  · Estimated right ventricular systolic pressure from tricuspid regurgitation is normal (<35 mmHg).  · Left ventricular diastolic function was normal.        Assessment and plan:  Shortness of breath  PFTs August 21, 2021 with FEV1/ FVC 80% normal spirometry and volume with decreased DLCO 64: Echocardiogram February 2022 within normal limits     Small lung granulomas: Chest August 2021 shows 3 mm subpleural right lower lobe nodule that has a groundglass appearance  CT scan of abdomen October 2021 showing a 4 mm subpleural nodule in right lung base: Repeat CT scan of the chest February 2022 showed resolution " of the nodules, no further CT will be needed unless there are new symptoms     COPD: [place] inhaler to have was very abusive to 2 puffs twice daily, DuoNeb as needed, albuterol inhaler as needed  Tobacco smoking: Smoking cessation counseling, Nicotine patch     Mild IDA: HST AHI 6  Pt could not tolerate CPAP due claustrophobia   Discussed SAFETY DRIVING  ADEQUATE SLEEP HYGEINE  DISCUSSED CARDIOVASCULAR & METABOLIC SIDE EFFECTS OF UNTREATED IDA     I personally reviewed the latest radiological study  Patient is advised to stay up-to-date on immunizations for flu, pneumococcal and COVID-19

## 2025-04-21 ENCOUNTER — TELEPHONE (OUTPATIENT)
Dept: PULMONOLOGY | Facility: HOSPITAL | Age: 40
End: 2025-04-21
Payer: MEDICAID

## 2025-04-21 RX ORDER — NICOTINE 21 MG/24HR
1 PATCH, TRANSDERMAL 24 HOURS TRANSDERMAL EVERY 24 HOURS
Qty: 28 EACH | Refills: 0 | Status: SHIPPED | OUTPATIENT
Start: 2025-04-21

## 2025-04-21 NOTE — TELEPHONE ENCOUNTER
Rcv'd fax from pharmacy that they are out of stock on the Nicotine transdermal system. Requesting that we escribe step 1,2,3 separate. Per Dr. Tsering garner to prescribe. Updated medication list and escribed as requested.

## 2025-05-06 ENCOUNTER — OFFICE VISIT (OUTPATIENT)
Dept: PAIN MEDICINE | Facility: CLINIC | Age: 40
End: 2025-05-06
Payer: MEDICAID

## 2025-05-06 VITALS
BODY MASS INDEX: 37.11 KG/M2 | RESPIRATION RATE: 16 BRPM | HEART RATE: 61 BPM | SYSTOLIC BLOOD PRESSURE: 124 MMHG | WEIGHT: 190 LBS | OXYGEN SATURATION: 99 % | DIASTOLIC BLOOD PRESSURE: 71 MMHG

## 2025-05-06 DIAGNOSIS — M47.812 CERVICAL SPONDYLOSIS WITHOUT MYELOPATHY: ICD-10-CM

## 2025-05-06 DIAGNOSIS — G89.4 CHRONIC PAIN SYNDROME: ICD-10-CM

## 2025-05-06 DIAGNOSIS — M79.7 FIBROMYALGIA: ICD-10-CM

## 2025-05-06 DIAGNOSIS — M25.50 POLYARTHRALGIA: ICD-10-CM

## 2025-05-06 DIAGNOSIS — M47.817 LUMBOSACRAL SPONDYLOSIS WITHOUT MYELOPATHY: Primary | ICD-10-CM

## 2025-05-06 PROCEDURE — 1125F AMNT PAIN NOTED PAIN PRSNT: CPT | Performed by: ANESTHESIOLOGY

## 2025-05-06 PROCEDURE — 99214 OFFICE O/P EST MOD 30 MIN: CPT | Performed by: ANESTHESIOLOGY

## 2025-05-06 RX ORDER — CELECOXIB 200 MG/1
200 CAPSULE ORAL DAILY
Qty: 90 CAPSULE | Refills: 1 | Status: SHIPPED | OUTPATIENT
Start: 2025-05-06

## 2025-05-06 RX ORDER — PREGABALIN 75 MG/1
75 CAPSULE ORAL 3 TIMES DAILY
Qty: 90 CAPSULE | Refills: 1 | Status: SHIPPED | OUTPATIENT
Start: 2025-05-06

## 2025-05-06 NOTE — PROGRESS NOTES
Subjective    CC neck, upper and lower back and multiple joint pain  Rand Schilling is a 39 y.o. female with history of chronic granulomatous disease, chronic pain/polyarthralgia, chronic neck and back pain here for follow-up.     Last seen several months ago.  Complains of worsening pain since she has been off Lyrica and nabumetone.  Recently started Celebrex by PCP with mild relief.  Generalized myofascial pain, polyarthralgia impairing ADL and interfering with sleep.    Chronic back pain radiating to bilateral hips and left lower extremity with burning tingling numbness in the leg and sharp pain worse with activity however constant.  Denies saddle anesthesia bladder bowel continence.  Chronic neck pain radiating to bilateral shoulders and upper thoracic paraspinal areas.  Denies upper extremity radicular pain.  States she had dealt with pain since she was 16 years old uncertain about her chronic pain symptoms thinking it is likely related to chronic granulomatous disease, she is filing for disability for these reasons.  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  Pain impairing her daily activity and sleep.  Have tried home exercise program, physical therapy in the past but cannot participate due to pain.    C-spine MRI 3/2023 stable MRI of cervical spine demonstrating minimal early spondylosis changes without area of associated spinal canal or neuroforaminal narrowing.  L-spine MRI 3/2023 multiple level lumbar spondylosis overall similar to previous however bilateral foraminal narrowing is present at L4-L5 moderate to severe may be mildly progressing from previous.  C-spine MRI 2022: Mild C4 C6 spondylosis. No herniated disc canal or neuroforaminal stenosis.   C-spine x-ray 2021 early degenerative changes C4-C5 and C5-C6.  L-spine MRI 2021 mild multilevel degenerative changes of lumbar spine no canal stenosis. Mild bilateral foraminal narrowing at L4-L5, mild left foraminal narrowing L3-L4 and  L5-S1.  Right knee MRI  mild patellar chondromalacia otherwise no evidence of internal derangement or significant degenerative change.    Pain Assessment   Location of Pain: All over  Description of Pain: Dull/Aching, Throbbing, Stabbing  Previous Pain Rating :7  Current Pain Ratin  Aggravating Factors: Activity  Alleviating Factors: Rest, Medication    PEG Assessment   What number best describes your pain on average in the past week?7  What number best describes how, during the past week, pain has interfered with your enjoyment of life?4  What number best describes how, during the past week, pain has interfered with your general activity?  10    The following portions of the patient's history were reviewed and updated as appropriate: allergies, current medications, past family history, past medical history, past social history, past surgical history and problem list.    Review of Systems   Musculoskeletal:  Positive for arthralgias, back pain, myalgias and neck pain.   All other systems reviewed and are negative.      Objective   Physical Exam  Vitals reviewed.   Constitutional:       General: She is not in acute distress.     Appearance: She is obese.      Comments: Facial asymmetry, Bell's palsy, right.   Pulmonary:      Effort: Pulmonary effort is normal.   Musculoskeletal:      Cervical back: Tenderness present. Decreased range of motion.      Lumbar back: Tenderness present. Decreased range of motion. Positive left straight leg raise test.      Comments: Lumbar loading positive, pain on extension of low back past 5 degrees.  TTP on the lumbar facets noted.         /71   Pulse 61   Resp 16   Wt 86.2 kg (190 lb)   SpO2 99%   BMI 37.11 kg/m²     PHQ 9 on chart  Opioid risk tool moderate risk (age, psych history, history of abuse)    Assessment & Plan   Diagnoses and all orders for this visit:    1. Lumbosacral spondylosis without myelopathy (Primary)  -     celecoxib (CeleBREX) 200 MG capsule;  Take 1 capsule by mouth Daily.  Dispense: 90 capsule; Refill: 1    2. Fibromyalgia  -     pregabalin (LYRICA) 75 MG capsule; Take 1 capsule by mouth 3 (Three) Times a Day.  Dispense: 90 capsule; Refill: 1    3. Polyarthralgia  -     Ambulatory Referral to Rheumatology  -     celecoxib (CeleBREX) 200 MG capsule; Take 1 capsule by mouth Daily.  Dispense: 90 capsule; Refill: 1    4. Cervical spondylosis without myelopathy  -     celecoxib (CeleBREX) 200 MG capsule; Take 1 capsule by mouth Daily.  Dispense: 90 capsule; Refill: 1    5. Chronic pain syndrome      Summary  Rand Schilling is a 39 y.o. female with history of chronic granulomatous disease, chronic pain/polyarthralgia, chronic neck and back pain here for follow-up.  Referred by PCP.  Chronic pain from lumbar and cervical DDD spondylosis, with radicular pain.  Chronic polyarthralgia generalized myofascial pain.    Last seen several months ago.  Complains of worsening pain since she has been off Lyrica and nabumetone.  Recently started Celebrex by PCP with mild relief.  Generalized myofascial pain, polyarthralgia impairing ADL and interfering with sleep.  Recently quit smoking.    Will restart Lyrica 75 mg 3 times daily for fibromyalgia.  Nabumetone and continue Celebrex.  Referral to rheumatology for polyarthralgia.    RTC 2-3 month

## 2025-06-26 ENCOUNTER — TELEPHONE (OUTPATIENT)
Dept: PAIN MEDICINE | Facility: CLINIC | Age: 40
End: 2025-06-26
Payer: MEDICAID

## 2025-07-01 ENCOUNTER — OFFICE VISIT (OUTPATIENT)
Dept: PAIN MEDICINE | Facility: CLINIC | Age: 40
End: 2025-07-01
Payer: OTHER GOVERNMENT

## 2025-07-01 VITALS
OXYGEN SATURATION: 99 % | WEIGHT: 188 LBS | SYSTOLIC BLOOD PRESSURE: 141 MMHG | RESPIRATION RATE: 16 BRPM | BODY MASS INDEX: 39.29 KG/M2 | DIASTOLIC BLOOD PRESSURE: 86 MMHG | HEART RATE: 87 BPM

## 2025-07-01 DIAGNOSIS — M43.00 PARS DEFECT: ICD-10-CM

## 2025-07-01 DIAGNOSIS — M54.16 LUMBAR RADICULITIS: ICD-10-CM

## 2025-07-01 DIAGNOSIS — M47.817 LUMBOSACRAL SPONDYLOSIS WITHOUT MYELOPATHY: Primary | ICD-10-CM

## 2025-07-01 DIAGNOSIS — M25.50 POLYARTHRALGIA: ICD-10-CM

## 2025-07-01 RX ORDER — HYDROCODONE BITARTRATE AND ACETAMINOPHEN 5; 325 MG/1; MG/1
1 TABLET ORAL EVERY 6 HOURS PRN
COMMUNITY
Start: 2025-06-27

## 2025-07-01 RX ORDER — METHOCARBAMOL 750 MG/1
750 TABLET, FILM COATED ORAL 3 TIMES DAILY
Qty: 90 TABLET | Refills: 1 | Status: SHIPPED | OUTPATIENT
Start: 2025-07-01

## 2025-07-01 RX ORDER — FLUCONAZOLE 150 MG/1
150 TABLET ORAL DAILY
COMMUNITY
Start: 2025-06-30

## 2025-07-01 RX ORDER — NITROFURANTOIN 25; 75 MG/1; MG/1
100 CAPSULE ORAL 2 TIMES DAILY
COMMUNITY
Start: 2025-06-30 | End: 2025-07-07

## 2025-07-01 RX ORDER — METHYLPREDNISOLONE 4 MG/1
TABLET ORAL
Qty: 21 TABLET | Refills: 0 | Status: SHIPPED | OUTPATIENT
Start: 2025-07-01
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
## 2025-07-12 DIAGNOSIS — M79.7 FIBROMYALGIA: ICD-10-CM

## 2025-07-14 RX ORDER — PREGABALIN 75 MG/1
75 CAPSULE ORAL 3 TIMES DAILY
Qty: 90 CAPSULE | Refills: 1 | Status: SHIPPED | OUTPATIENT
Start: 2025-07-14

## 2025-07-25 ENCOUNTER — HOSPITAL ENCOUNTER (OUTPATIENT)
Dept: MRI IMAGING | Facility: HOSPITAL | Age: 40
Discharge: HOME | End: 2025-07-25
Payer: OTHER GOVERNMENT

## 2025-07-25 PROCEDURE — 72148 MRI LUMBAR SPINE W/O DYE: CPT

## 2025-08-06 ENCOUNTER — OFFICE VISIT (OUTPATIENT)
Dept: NEUROSURGERY | Facility: CLINIC | Age: 40
End: 2025-08-06
Payer: OTHER GOVERNMENT

## 2025-08-06 VITALS
RESPIRATION RATE: 18 BRPM | HEART RATE: 78 BPM | WEIGHT: 178 LBS | OXYGEN SATURATION: 97 % | HEIGHT: 58 IN | BODY MASS INDEX: 37.36 KG/M2 | DIASTOLIC BLOOD PRESSURE: 86 MMHG | SYSTOLIC BLOOD PRESSURE: 130 MMHG

## 2025-08-06 DIAGNOSIS — M54.17 LUMBOSACRAL RADICULOPATHY: ICD-10-CM

## 2025-08-06 DIAGNOSIS — M43.06 PARS DEFECT OF LUMBAR SPINE: ICD-10-CM

## 2025-08-06 DIAGNOSIS — M51.372 DEGENERATION OF INTERVERTEBRAL DISC OF LUMBOSACRAL REGION WITH DISCOGENIC BACK PAIN AND LOWER EXTREMITY PAIN: Primary | ICD-10-CM

## 2025-08-07 ENCOUNTER — PATIENT ROUNDING (BHMG ONLY) (OUTPATIENT)
Dept: NEUROSURGERY | Facility: CLINIC | Age: 40
End: 2025-08-07
Payer: OTHER GOVERNMENT

## 2025-08-19 ENCOUNTER — HOSPITAL ENCOUNTER (OUTPATIENT)
Dept: GENERAL RADIOLOGY | Facility: HOSPITAL | Age: 40
Discharge: HOME | End: 2025-08-19
Payer: OTHER GOVERNMENT

## 2025-08-19 DIAGNOSIS — M43.06 PARS DEFECT OF LUMBAR SPINE: ICD-10-CM

## 2025-08-19 DIAGNOSIS — M51.372 DEGENERATION OF INTERVERTEBRAL DISC OF LUMBOSACRAL REGION WITH DISCOGENIC BACK PAIN AND LOWER EXTREMITY PAIN: ICD-10-CM

## 2025-08-19 DIAGNOSIS — M54.17 LUMBOSACRAL RADICULOPATHY: ICD-10-CM

## 2025-08-19 PROCEDURE — 72114 X-RAY EXAM L-S SPINE BENDING: CPT

## 2025-08-24 DIAGNOSIS — M47.817 LUMBOSACRAL SPONDYLOSIS WITHOUT MYELOPATHY: ICD-10-CM

## 2025-08-24 DIAGNOSIS — M54.16 LUMBAR RADICULITIS: ICD-10-CM

## 2025-08-25 ENCOUNTER — OFFICE VISIT (OUTPATIENT)
Dept: NEUROSURGERY | Facility: CLINIC | Age: 40
End: 2025-08-25
Payer: OTHER GOVERNMENT

## 2025-08-25 VITALS
HEART RATE: 71 BPM | RESPIRATION RATE: 18 BRPM | WEIGHT: 178 LBS | HEIGHT: 58 IN | BODY MASS INDEX: 37.36 KG/M2 | OXYGEN SATURATION: 99 %

## 2025-08-25 DIAGNOSIS — M43.06 PARS DEFECT OF LUMBAR SPINE: Primary | ICD-10-CM

## 2025-08-25 DIAGNOSIS — M54.16 LUMBAR RADICULOPATHY: ICD-10-CM

## 2025-08-25 PROCEDURE — 99214 OFFICE O/P EST MOD 30 MIN: CPT | Performed by: NEUROLOGICAL SURGERY

## 2025-08-25 PROCEDURE — 1159F MED LIST DOCD IN RCRD: CPT | Performed by: NEUROLOGICAL SURGERY

## 2025-08-25 PROCEDURE — 1160F RVW MEDS BY RX/DR IN RCRD: CPT | Performed by: NEUROLOGICAL SURGERY

## 2025-08-25 RX ORDER — METHOCARBAMOL 750 MG/1
750 TABLET, FILM COATED ORAL 3 TIMES DAILY
Qty: 90 TABLET | Refills: 1 | Status: SHIPPED | OUTPATIENT
Start: 2025-08-25

## (undated) DEVICE — PK ENDO GI 50

## (undated) DEVICE — TRAP WIDEEYE POLYP

## (undated) DEVICE — SNAR POLYP HOTSNARE/BRAIDED OVL/MINI 7F 2.8X10MM 230CM 1P/U